# Patient Record
Sex: FEMALE | Race: WHITE | Employment: PART TIME | URBAN - METROPOLITAN AREA
[De-identification: names, ages, dates, MRNs, and addresses within clinical notes are randomized per-mention and may not be internally consistent; named-entity substitution may affect disease eponyms.]

---

## 2017-08-18 ENCOUNTER — HOSPITAL ENCOUNTER (EMERGENCY)
Age: 22
Discharge: HOME | End: 2017-08-18
Attending: EMERGENCY MEDICINE
Payer: COMMERCIAL

## 2017-08-18 VITALS
BODY MASS INDEX: 41.46 KG/M2 | SYSTOLIC BLOOD PRESSURE: 122 MMHG | TEMPERATURE: 99 F | HEIGHT: 66 IN | RESPIRATION RATE: 16 BRPM | DIASTOLIC BLOOD PRESSURE: 76 MMHG | HEART RATE: 97 BPM | WEIGHT: 258 LBS

## 2017-08-18 DIAGNOSIS — L02.91 ABSCESS: Primary | ICD-10-CM

## 2017-08-18 PROCEDURE — 87147 CULTURE TYPE IMMUNOLOGIC: CPT | Performed by: NURSE PRACTITIONER

## 2017-08-18 PROCEDURE — 10061 I&D ABSCESS COMP/MULTIPLE: CPT

## 2017-08-18 PROCEDURE — 87186 SC STD MICRODIL/AGAR DIL: CPT | Performed by: NURSE PRACTITIONER

## 2017-08-18 PROCEDURE — 87070 CULTURE OTHR SPECIMN AEROBIC: CPT | Performed by: NURSE PRACTITIONER

## 2017-08-18 PROCEDURE — 87205 SMEAR GRAM STAIN: CPT | Performed by: NURSE PRACTITIONER

## 2017-08-18 PROCEDURE — 99283 EMERGENCY DEPT VISIT LOW MDM: CPT

## 2017-08-18 RX ORDER — SULFAMETHOXAZOLE AND TRIMETHOPRIM 800; 160 MG/1; MG/1
1 TABLET ORAL 2 TIMES DAILY
Qty: 20 TABLET | Refills: 0 | Status: SHIPPED | OUTPATIENT
Start: 2017-08-18 | End: 2017-08-28

## 2017-08-19 NOTE — ED PROVIDER NOTES
Patient Seen in: THE [facility] Emergency Department In [place]    History   Patient presents with:  Abscess (integumentary)    Stated Complaint: ABSCESS    26-year-old female who presents to the emergency room with complaints of a abscess and erythema to the • High Blood Pressure Paternal Grandmother        Smoking status: Never Smoker                                                              Smokeless tobacco: Never Used                      Alcohol use:  No                Review of Systems   Constitutional Psychiatric: She has a normal mood and affect. Her behavior is normal. Judgment and thought content normal.   Nursing note and vitals reviewed.         ED Course     Labs Reviewed   AEROBIC BACTERIAL CULTURE   Procedure note:   Verbal consent obtained from

## 2017-11-06 PROCEDURE — 87660 TRICHOMONAS VAGIN DIR PROBE: CPT | Performed by: OBSTETRICS & GYNECOLOGY

## 2017-11-06 PROCEDURE — 87480 CANDIDA DNA DIR PROBE: CPT | Performed by: OBSTETRICS & GYNECOLOGY

## 2017-11-06 PROCEDURE — 88175 CYTOPATH C/V AUTO FLUID REDO: CPT | Performed by: OBSTETRICS & GYNECOLOGY

## 2017-11-06 PROCEDURE — 87491 CHLMYD TRACH DNA AMP PROBE: CPT | Performed by: OBSTETRICS & GYNECOLOGY

## 2017-11-06 PROCEDURE — 87591 N.GONORRHOEAE DNA AMP PROB: CPT | Performed by: OBSTETRICS & GYNECOLOGY

## 2017-11-06 PROCEDURE — 87510 GARDNER VAG DNA DIR PROBE: CPT | Performed by: OBSTETRICS & GYNECOLOGY

## 2018-05-22 ENCOUNTER — LAB ENCOUNTER (OUTPATIENT)
Dept: LAB | Age: 23
End: 2018-05-22
Attending: PHYSICIAN ASSISTANT
Payer: COMMERCIAL

## 2018-05-22 DIAGNOSIS — L30.4 INTERTRIGO: ICD-10-CM

## 2018-05-22 PROCEDURE — 87206 SMEAR FLUORESCENT/ACID STAI: CPT

## 2018-05-22 PROCEDURE — 87077 CULTURE AEROBIC IDENTIFY: CPT

## 2018-05-22 PROCEDURE — 87102 FUNGUS ISOLATION CULTURE: CPT

## 2018-05-22 PROCEDURE — 87205 SMEAR GRAM STAIN: CPT

## 2018-05-22 PROCEDURE — 87070 CULTURE OTHR SPECIMN AEROBIC: CPT

## 2018-05-22 PROCEDURE — 87186 SC STD MICRODIL/AGAR DIL: CPT

## 2018-05-25 NOTE — PROGRESS NOTES
Begin Silvadene cream to affected areas daily. May mix with ketoconazole for ease of use if desired. See TE for details.

## 2018-11-25 ENCOUNTER — APPOINTMENT (OUTPATIENT)
Dept: GENERAL RADIOLOGY | Age: 23
End: 2018-11-25
Attending: EMERGENCY MEDICINE
Payer: COMMERCIAL

## 2018-11-25 ENCOUNTER — HOSPITAL ENCOUNTER (EMERGENCY)
Age: 23
Discharge: HOME | End: 2018-11-25
Attending: EMERGENCY MEDICINE
Payer: COMMERCIAL

## 2018-11-25 VITALS
TEMPERATURE: 98 F | SYSTOLIC BLOOD PRESSURE: 115 MMHG | RESPIRATION RATE: 16 BRPM | DIASTOLIC BLOOD PRESSURE: 56 MMHG | WEIGHT: 265 LBS | HEART RATE: 98 BPM | HEIGHT: 65 IN | OXYGEN SATURATION: 97 % | BODY MASS INDEX: 44.15 KG/M2

## 2018-11-25 DIAGNOSIS — J06.9 VIRAL UPPER RESPIRATORY INFECTION: Primary | ICD-10-CM

## 2018-11-25 DIAGNOSIS — J45.901 ASTHMA EXACERBATION, MILD: ICD-10-CM

## 2018-11-25 PROCEDURE — 94640 AIRWAY INHALATION TREATMENT: CPT

## 2018-11-25 PROCEDURE — 71046 X-RAY EXAM CHEST 2 VIEWS: CPT | Performed by: EMERGENCY MEDICINE

## 2018-11-25 PROCEDURE — 99284 EMERGENCY DEPT VISIT MOD MDM: CPT

## 2018-11-25 RX ORDER — PREDNISONE 20 MG/1
60 TABLET ORAL ONCE
Status: COMPLETED | OUTPATIENT
Start: 2018-11-25 | End: 2018-11-25

## 2018-11-25 RX ORDER — CODEINE PHOSPHATE AND GUAIFENESIN 10; 100 MG/5ML; MG/5ML
5 SOLUTION ORAL EVERY 6 HOURS PRN
Qty: 180 ML | Refills: 0 | Status: SHIPPED | OUTPATIENT
Start: 2018-11-25 | End: 2020-02-20 | Stop reason: ALTCHOICE

## 2018-11-25 RX ORDER — PREDNISONE 20 MG/1
40 TABLET ORAL DAILY
Qty: 10 TABLET | Refills: 0 | Status: SHIPPED | OUTPATIENT
Start: 2018-11-25 | End: 2018-11-30

## 2018-11-25 RX ORDER — IPRATROPIUM BROMIDE AND ALBUTEROL SULFATE 2.5; .5 MG/3ML; MG/3ML
3 SOLUTION RESPIRATORY (INHALATION) ONCE
Status: COMPLETED | OUTPATIENT
Start: 2018-11-25 | End: 2018-11-25

## 2018-11-28 NOTE — ED PROVIDER NOTES
Patient Seen in: THE [facility] Emergency Department In [place]    History   Patient presents with:  Cough/URI    Stated Complaint: cough and SOB, denies fever    HPI    49-year-old female presents emergency department had a bad cough since Monday along with nondistended, no rebound no guarding  no hepatosplenomegaly bowel sounds are present , no pulsatile mass  Extremities: No clubbing cyanosis or edema 2+ distal pulses.   Neuro: Cranial nerves II through XII intact with no gross focal sensory or motor abnorma

## 2018-12-09 ENCOUNTER — HOSPITAL ENCOUNTER (EMERGENCY)
Age: 23
Discharge: HOME | End: 2018-12-09
Payer: COMMERCIAL

## 2018-12-09 VITALS
RESPIRATION RATE: 18 BRPM | OXYGEN SATURATION: 97 % | SYSTOLIC BLOOD PRESSURE: 101 MMHG | HEIGHT: 65 IN | BODY MASS INDEX: 43.32 KG/M2 | TEMPERATURE: 97 F | DIASTOLIC BLOOD PRESSURE: 68 MMHG | WEIGHT: 260 LBS | HEART RATE: 93 BPM

## 2018-12-09 DIAGNOSIS — H66.91 RIGHT OTITIS MEDIA, UNSPECIFIED OTITIS MEDIA TYPE: Primary | ICD-10-CM

## 2018-12-09 PROCEDURE — 99283 EMERGENCY DEPT VISIT LOW MDM: CPT

## 2018-12-09 PROCEDURE — 87430 STREP A AG IA: CPT | Performed by: PHYSICIAN ASSISTANT

## 2018-12-09 PROCEDURE — 87081 CULTURE SCREEN ONLY: CPT | Performed by: PHYSICIAN ASSISTANT

## 2018-12-09 PROCEDURE — 87147 CULTURE TYPE IMMUNOLOGIC: CPT | Performed by: PHYSICIAN ASSISTANT

## 2018-12-09 RX ORDER — AZITHROMYCIN 250 MG/1
TABLET, FILM COATED ORAL
Qty: 1 PACKAGE | Refills: 0 | Status: SHIPPED | OUTPATIENT
Start: 2018-12-09 | End: 2018-12-14

## 2018-12-09 NOTE — ED PROVIDER NOTES
Patient Seen in: THE [facility] Emergency Department In [place]    History   Patient presents with:  Ear Problem Pain (neurosensory)    Stated Complaint: r. ear pain    44-year-old  female with a history of asthma, allergic rhinitis presents to the E is noted. Left Ear: Hearing, tympanic membrane, external ear and ear canal normal.   Nose: Nose normal. Right sinus exhibits no maxillary sinus tenderness and no frontal sinus tenderness.  Left sinus exhibits no maxillary sinus tenderness and no frontal s

## 2018-12-14 ENCOUNTER — HOSPITAL ENCOUNTER (EMERGENCY)
Age: 23
Discharge: HOME | End: 2018-12-14
Attending: EMERGENCY MEDICINE
Payer: MEDICAID

## 2018-12-14 ENCOUNTER — APPOINTMENT (OUTPATIENT)
Dept: GENERAL RADIOLOGY | Age: 23
End: 2018-12-14
Attending: NURSE PRACTITIONER
Payer: MEDICAID

## 2018-12-14 VITALS
BODY MASS INDEX: 43.32 KG/M2 | TEMPERATURE: 100 F | HEART RATE: 99 BPM | HEIGHT: 65 IN | WEIGHT: 260 LBS | RESPIRATION RATE: 18 BRPM | DIASTOLIC BLOOD PRESSURE: 63 MMHG | SYSTOLIC BLOOD PRESSURE: 129 MMHG | OXYGEN SATURATION: 98 %

## 2018-12-14 DIAGNOSIS — J45.901 ASTHMA EXACERBATION, MILD: Primary | ICD-10-CM

## 2018-12-14 PROCEDURE — 71046 X-RAY EXAM CHEST 2 VIEWS: CPT | Performed by: NURSE PRACTITIONER

## 2018-12-14 PROCEDURE — 99284 EMERGENCY DEPT VISIT MOD MDM: CPT

## 2018-12-14 PROCEDURE — 94640 AIRWAY INHALATION TREATMENT: CPT

## 2018-12-14 RX ORDER — IPRATROPIUM BROMIDE AND ALBUTEROL SULFATE 2.5; .5 MG/3ML; MG/3ML
3 SOLUTION RESPIRATORY (INHALATION) ONCE
Status: COMPLETED | OUTPATIENT
Start: 2018-12-14 | End: 2018-12-14

## 2018-12-14 RX ORDER — BENZONATATE 100 MG/1
100 CAPSULE ORAL 3 TIMES DAILY PRN
Qty: 30 CAPSULE | Refills: 0 | Status: SHIPPED | OUTPATIENT
Start: 2018-12-14 | End: 2019-01-13

## 2018-12-14 RX ORDER — PREDNISONE 20 MG/1
20 TABLET ORAL 2 TIMES DAILY
Qty: 10 TABLET | Refills: 0 | Status: SHIPPED | OUTPATIENT
Start: 2018-12-14 | End: 2018-12-19

## 2018-12-14 RX ORDER — PREDNISONE 20 MG/1
60 TABLET ORAL ONCE
Status: COMPLETED | OUTPATIENT
Start: 2018-12-14 | End: 2018-12-14

## 2018-12-14 RX ORDER — IBUPROFEN 800 MG/1
800 TABLET ORAL ONCE
Status: COMPLETED | OUTPATIENT
Start: 2018-12-14 | End: 2018-12-14

## 2018-12-14 RX ORDER — IPRATROPIUM BROMIDE AND ALBUTEROL SULFATE 2.5; .5 MG/3ML; MG/3ML
3 SOLUTION RESPIRATORY (INHALATION) ONCE
Status: DISCONTINUED | OUTPATIENT
Start: 2018-12-14 | End: 2018-12-14

## 2018-12-15 NOTE — ED PROVIDER NOTES
Patient Seen in: [facility] Emergency Department In [place]    History   Patient presents with:  Cough/URI    Stated Complaint: cough-hx asthma    71-year-old female who presents to the emergency room with complaints of a worsening cough for last 2-3 week complaint: cough-hx asthma  Other systems are as noted in HPI. Constitutional and vital signs reviewed. All other systems reviewed and negative except as noted above.     Physical Exam     ED Triage Vitals [12/14/18 1852]   /59   Pulse 101   Res XR CHEST PA + LAT CHEST (CPT=71046)  INDICATIONS:  cough-hx asthma  COMPARISON:  [facility], XR CHEST PA + LAT CHEST (OPT=79685), 11/25/2018, 18:45. TECHNIQUE:  PA and lateral chest radiographs were obtained.   PATIENT STATED HISTORY: (As transcribed by Te expressed clear understanding of these instructions and agrees to the following plan provided.   The patient and/or family was also given written discharge instructions including information regarding today's visit and indications prompting immediate return

## 2018-12-15 NOTE — ED INITIAL ASSESSMENT (HPI)
C/o cough for 2-3 weeks, increased 12/13. States coughs so hard, causes her to leak urine and \"see spots\". Hx of asthma, has used inhaler \"several\" times today, unsure of number.  Fever here, not checked at home  States just finished Z-pack for ear infe

## 2019-11-07 ENCOUNTER — APPOINTMENT (OUTPATIENT)
Dept: CT IMAGING | Age: 24
End: 2019-11-07
Attending: EMERGENCY MEDICINE
Payer: MEDICAID

## 2019-11-07 ENCOUNTER — HOSPITAL ENCOUNTER (EMERGENCY)
Age: 24
Discharge: HOME | End: 2019-11-07
Attending: EMERGENCY MEDICINE
Payer: MEDICAID

## 2019-11-07 VITALS
SYSTOLIC BLOOD PRESSURE: 128 MMHG | DIASTOLIC BLOOD PRESSURE: 83 MMHG | OXYGEN SATURATION: 100 % | RESPIRATION RATE: 14 BRPM | TEMPERATURE: 98 F | HEIGHT: 65 IN | WEIGHT: 286 LBS | BODY MASS INDEX: 47.65 KG/M2 | HEART RATE: 79 BPM

## 2019-11-07 DIAGNOSIS — I88.0 MESENTERIC ADENITIS: Primary | ICD-10-CM

## 2019-11-07 PROCEDURE — 81025 URINE PREGNANCY TEST: CPT

## 2019-11-07 PROCEDURE — 87086 URINE CULTURE/COLONY COUNT: CPT | Performed by: EMERGENCY MEDICINE

## 2019-11-07 PROCEDURE — 80053 COMPREHEN METABOLIC PANEL: CPT | Performed by: EMERGENCY MEDICINE

## 2019-11-07 PROCEDURE — 96374 THER/PROPH/DIAG INJ IV PUSH: CPT

## 2019-11-07 PROCEDURE — 85025 COMPLETE CBC W/AUTO DIFF WBC: CPT | Performed by: EMERGENCY MEDICINE

## 2019-11-07 PROCEDURE — 96361 HYDRATE IV INFUSION ADD-ON: CPT

## 2019-11-07 PROCEDURE — 99284 EMERGENCY DEPT VISIT MOD MDM: CPT

## 2019-11-07 PROCEDURE — 74177 CT ABD & PELVIS W/CONTRAST: CPT | Performed by: EMERGENCY MEDICINE

## 2019-11-07 PROCEDURE — 83690 ASSAY OF LIPASE: CPT | Performed by: EMERGENCY MEDICINE

## 2019-11-07 PROCEDURE — 81001 URINALYSIS AUTO W/SCOPE: CPT | Performed by: EMERGENCY MEDICINE

## 2019-11-07 RX ORDER — KETOROLAC TROMETHAMINE 30 MG/ML
30 INJECTION, SOLUTION INTRAMUSCULAR; INTRAVENOUS ONCE
Status: COMPLETED | OUTPATIENT
Start: 2019-11-07 | End: 2019-11-07

## 2019-11-07 RX ORDER — KETOROLAC TROMETHAMINE 10 MG/1
10 TABLET, FILM COATED ORAL EVERY 6 HOURS PRN
Qty: 30 TABLET | Refills: 0 | Status: SHIPPED | OUTPATIENT
Start: 2019-11-07 | End: 2019-11-14

## 2019-11-07 NOTE — ED PROVIDER NOTES
Patient Seen in: THE [facility] Emergency Department In [place]      History   Patient presents with:  Abdomen/Flank Pain (GI/)    Stated Complaint: mid abd pain    HPI  22-year-old female presents for evaluation of abdominal pain.   Patient describes Tianna Frias rhythm. Abdomen: Soft, epigastric, right upper quadrant and right lateral abdominal tenderness, no Escalona sign, tenderness just above McBurney's point, no CVA tenderness  Skin: No rash. No edema. Neurologic: No focal neurologic deficits.   Normal speech Radiology Data Registry) which includes the Dose Index Registry. PATIENT STATED HISTORY:(As transcribed by Technologist)  Patient complains of mid upper abdominal pain and nausea today.    CONTRAST USED:  100cc of Omnipaque 350  FINDINGS:  APPENDIX:  Not w diagnosis)    Disposition:  There is no disposition on file for this visit. There is no disposition time on file for this visit.     Follow-up:  Jean-Pierre Ye MD  [address] [place] [address]  [phone]    Schedule an appointment as soon as p

## 2019-11-08 NOTE — ED NOTES
Received report from Kati Figueroa, [facility]. Pt is in [place].

## 2019-11-09 ENCOUNTER — HOSPITAL ENCOUNTER (EMERGENCY)
Age: 24
Discharge: HOME | End: 2019-11-09
Attending: EMERGENCY MEDICINE
Payer: MEDICAID

## 2019-11-09 VITALS
HEIGHT: 65 IN | TEMPERATURE: 98 F | DIASTOLIC BLOOD PRESSURE: 60 MMHG | RESPIRATION RATE: 18 BRPM | SYSTOLIC BLOOD PRESSURE: 115 MMHG | WEIGHT: 280 LBS | OXYGEN SATURATION: 94 % | BODY MASS INDEX: 46.65 KG/M2 | HEART RATE: 73 BPM

## 2019-11-09 DIAGNOSIS — R10.9 ABDOMINAL PAIN, ACUTE: Primary | ICD-10-CM

## 2019-11-09 PROCEDURE — 81025 URINE PREGNANCY TEST: CPT

## 2019-11-09 PROCEDURE — 81001 URINALYSIS AUTO W/SCOPE: CPT | Performed by: EMERGENCY MEDICINE

## 2019-11-09 PROCEDURE — 80053 COMPREHEN METABOLIC PANEL: CPT | Performed by: EMERGENCY MEDICINE

## 2019-11-09 PROCEDURE — 99284 EMERGENCY DEPT VISIT MOD MDM: CPT

## 2019-11-09 PROCEDURE — 96361 HYDRATE IV INFUSION ADD-ON: CPT

## 2019-11-09 PROCEDURE — 96375 TX/PRO/DX INJ NEW DRUG ADDON: CPT

## 2019-11-09 PROCEDURE — 85025 COMPLETE CBC W/AUTO DIFF WBC: CPT | Performed by: EMERGENCY MEDICINE

## 2019-11-09 PROCEDURE — 96374 THER/PROPH/DIAG INJ IV PUSH: CPT

## 2019-11-09 PROCEDURE — 83690 ASSAY OF LIPASE: CPT | Performed by: EMERGENCY MEDICINE

## 2019-11-09 RX ORDER — TRAMADOL HYDROCHLORIDE 50 MG/1
TABLET ORAL EVERY 6 HOURS PRN
Qty: 10 TABLET | Refills: 0 | Status: SHIPPED | OUTPATIENT
Start: 2019-11-09 | End: 2019-11-16

## 2019-11-09 RX ORDER — HYDROMORPHONE HYDROCHLORIDE 1 MG/ML
1 INJECTION, SOLUTION INTRAMUSCULAR; INTRAVENOUS; SUBCUTANEOUS ONCE
Status: COMPLETED | OUTPATIENT
Start: 2019-11-09 | End: 2019-11-09

## 2019-11-09 RX ORDER — IBUPROFEN 600 MG/1
600 TABLET ORAL EVERY 8 HOURS PRN
Qty: 30 TABLET | Refills: 0 | Status: SHIPPED | OUTPATIENT
Start: 2019-11-09 | End: 2019-11-16

## 2019-11-09 RX ORDER — ONDANSETRON 2 MG/ML
4 INJECTION INTRAMUSCULAR; INTRAVENOUS ONCE
Status: COMPLETED | OUTPATIENT
Start: 2019-11-09 | End: 2019-11-09

## 2019-11-10 NOTE — ED PROVIDER NOTES
Patient Seen in: [facility] Emergency Department In [place]      History   Patient presents with:  Abdominal Pain    Stated Complaint: Pt presents to the ER with complaint of generalized abdominal pain with no reli*    HPI    60-year-old female presents t Physical Exam    General appearance: This is a young adult female sitting on a gurney. Vital signs were reviewed per nurse's notes. HEENT: Normocephalic atraumatic. Anicteric sclera.   Oral mucosa is moist.  Oropharynx is normal.  Neck: No adenop nontender. The patient was symptomatically improved. We discussed repeat test results. In the absence of any leukocytosis or ongoing abdominal tenderness, I recommended deferring reimaging at this time.   Symptoms are likely most consistent with a mesent

## 2019-11-10 NOTE — ED INITIAL ASSESSMENT (HPI)
Pt presents to the ER with complaint of generalized abdominal pain with no relief from tyelenol. Endorses nausea, denies vomiting.

## 2020-02-20 ENCOUNTER — OFFICE VISIT (OUTPATIENT)
Dept: FAMILY MEDICINE CLINIC | Facility: CLINIC | Age: 25
End: 2020-02-20

## 2020-02-20 VITALS
BODY MASS INDEX: 47.98 KG/M2 | WEIGHT: 288 LBS | RESPIRATION RATE: 16 BRPM | TEMPERATURE: 98 F | SYSTOLIC BLOOD PRESSURE: 120 MMHG | HEART RATE: 104 BPM | OXYGEN SATURATION: 100 % | DIASTOLIC BLOOD PRESSURE: 80 MMHG | HEIGHT: 65 IN

## 2020-02-20 DIAGNOSIS — Z00.00 LABORATORY EXAMINATION ORDERED AS PART OF A ROUTINE GENERAL MEDICAL EXAMINATION: ICD-10-CM

## 2020-02-20 DIAGNOSIS — Z01.419 WELL FEMALE EXAM WITH ROUTINE GYNECOLOGICAL EXAM: Primary | ICD-10-CM

## 2020-02-20 DIAGNOSIS — J45.40 MODERATE PERSISTENT ASTHMA WITHOUT COMPLICATION: ICD-10-CM

## 2020-02-20 DIAGNOSIS — N89.8 VAGINAL DISCHARGE: ICD-10-CM

## 2020-02-20 DIAGNOSIS — E66.01 CLASS 3 SEVERE OBESITY DUE TO EXCESS CALORIES WITHOUT SERIOUS COMORBIDITY WITH BODY MASS INDEX (BMI) OF 45.0 TO 49.9 IN ADULT (HCC): ICD-10-CM

## 2020-02-20 DIAGNOSIS — Z30.431 IUD CHECK UP: ICD-10-CM

## 2020-02-20 DIAGNOSIS — Z23 NEED FOR VACCINATION: ICD-10-CM

## 2020-02-20 DIAGNOSIS — L98.9: ICD-10-CM

## 2020-02-20 DIAGNOSIS — Z12.4 ROUTINE CERVICAL SMEAR: ICD-10-CM

## 2020-02-20 PROCEDURE — 87491 CHLMYD TRACH DNA AMP PROBE: CPT | Performed by: FAMILY MEDICINE

## 2020-02-20 PROCEDURE — 90472 IMMUNIZATION ADMIN EACH ADD: CPT | Performed by: FAMILY MEDICINE

## 2020-02-20 PROCEDURE — 90715 TDAP VACCINE 7 YRS/> IM: CPT | Performed by: FAMILY MEDICINE

## 2020-02-20 PROCEDURE — 99385 PREV VISIT NEW AGE 18-39: CPT | Performed by: FAMILY MEDICINE

## 2020-02-20 PROCEDURE — 88175 CYTOPATH C/V AUTO FLUID REDO: CPT | Performed by: FAMILY MEDICINE

## 2020-02-20 PROCEDURE — 90471 IMMUNIZATION ADMIN: CPT | Performed by: FAMILY MEDICINE

## 2020-02-20 PROCEDURE — 87591 N.GONORRHOEAE DNA AMP PROB: CPT | Performed by: FAMILY MEDICINE

## 2020-02-20 PROCEDURE — 90686 IIV4 VACC NO PRSV 0.5 ML IM: CPT | Performed by: FAMILY MEDICINE

## 2020-02-20 RX ORDER — NYSTATIN 100000 U/G
OINTMENT TOPICAL
Qty: 30 G | Refills: 2 | Status: SHIPPED | OUTPATIENT
Start: 2020-02-20 | End: 2020-04-08

## 2020-02-20 RX ORDER — FLUTICASONE PROPIONATE AND SALMETEROL 250; 50 UG/1; UG/1
1 POWDER RESPIRATORY (INHALATION) EVERY 12 HOURS SCHEDULED
Qty: 3 EACH | Refills: 3 | Status: SHIPPED | OUTPATIENT
Start: 2020-02-20 | End: 2021-09-01

## 2020-02-20 RX ORDER — METRONIDAZOLE 500 MG/1
500 TABLET ORAL 2 TIMES DAILY
Qty: 14 TABLET | Refills: 0 | Status: SHIPPED | OUTPATIENT
Start: 2020-02-20 | End: 2020-02-27

## 2020-02-20 RX ORDER — ALBUTEROL SULFATE 90 UG/1
AEROSOL, METERED RESPIRATORY (INHALATION)
Qty: 8.5 G | Refills: 5 | Status: SHIPPED | OUTPATIENT
Start: 2020-02-20 | End: 2020-07-07

## 2020-02-20 NOTE — PROGRESS NOTES
Moiz Verduzco is a 25year old female.     CC:  Patient presents with:  Establish Care  Well Adult: annual visit and pap smear       HPI:  HPV Vaccines(1 - Female 2-dose series) due on 05/02/2006  Annual Depression Screen due on 11/03/2016  Annual anything, or prepared to do anything to end your life? (lifetime): No  Score and Suggested Actions - Office Visit: No Risk  Score and Intervention  Score and Suggested Actions - Office Visit: No Risk        Allergies:    Ceclor                  UNKNOWN  Pe 08/08/2001      MMR                   09/28/1996 04/17/2000      Meningococcal-Menactra                          08/13/2010      TDAP                  08/13/2010      Tb Intradermal Test   07/20/2017 07/28/2017      Varicella             08/18/2005 tenderness  BREAST: No masses or lesions, no nipple discharge, normal appearing skin, no tenderness  LUNGS: clear to auscultation  CARDIO: RRR without murmur  GI: good BS's,no masses, HSM or tenderness  : +erythematus flat macular rash of perineum and la • Albuterol Sulfate HFA (PROAIR HFA) 108 (90 Base) MCG/ACT Inhalation Aero Soln 8.5 g 5     Sig: INHALE 1 TO 2 PUFFS BY MOUTH EVERY 4 TO 6 HOURS AS NEEDED   • fluticasone-salmeterol (ADVAIR DISKUS) 250-50 MCG/DOSE Inhalation Aerosol Powder, Breath Activa

## 2020-02-21 LAB
C TRACH DNA SPEC QL NAA+PROBE: NEGATIVE
N GONORRHOEA DNA SPEC QL NAA+PROBE: NEGATIVE

## 2020-02-22 ENCOUNTER — LAB ENCOUNTER (OUTPATIENT)
Dept: LAB | Age: 25
End: 2020-02-22
Attending: FAMILY MEDICINE
Payer: COMMERCIAL

## 2020-02-22 DIAGNOSIS — Z00.00 LABORATORY EXAMINATION ORDERED AS PART OF A ROUTINE GENERAL MEDICAL EXAMINATION: ICD-10-CM

## 2020-02-22 DIAGNOSIS — R73.01 ELEVATED FASTING BLOOD SUGAR: ICD-10-CM

## 2020-02-22 LAB
ALBUMIN SERPL-MCNC: 3.8 G/DL (ref 3.4–5)
ALBUMIN/GLOB SERPL: 1.2 {RATIO} (ref 1–2)
ALP LIVER SERPL-CCNC: 43 U/L (ref 37–98)
ALT SERPL-CCNC: 30 U/L (ref 13–56)
ANION GAP SERPL CALC-SCNC: 4 MMOL/L (ref 0–18)
AST SERPL-CCNC: 17 U/L (ref 15–37)
BASOPHILS # BLD AUTO: 0.04 X10(3) UL (ref 0–0.2)
BASOPHILS NFR BLD AUTO: 0.6 %
BILIRUB SERPL-MCNC: 0.5 MG/DL (ref 0.1–2)
BUN BLD-MCNC: 12 MG/DL (ref 7–18)
BUN/CREAT SERPL: 14 (ref 10–20)
CALCIUM BLD-MCNC: 9.2 MG/DL (ref 8.5–10.1)
CHLORIDE SERPL-SCNC: 106 MMOL/L (ref 98–112)
CHOLEST SMN-MCNC: 137 MG/DL (ref ?–200)
CO2 SERPL-SCNC: 29 MMOL/L (ref 21–32)
CREAT BLD-MCNC: 0.86 MG/DL (ref 0.55–1.02)
DEPRECATED RDW RBC AUTO: 39.4 FL (ref 35.1–46.3)
EOSINOPHIL # BLD AUTO: 0.25 X10(3) UL (ref 0–0.7)
EOSINOPHIL NFR BLD AUTO: 3.9 %
ERYTHROCYTE [DISTWIDTH] IN BLOOD BY AUTOMATED COUNT: 13.2 % (ref 11–15)
GLOBULIN PLAS-MCNC: 3.2 G/DL (ref 2.8–4.4)
GLUCOSE BLD-MCNC: 106 MG/DL (ref 70–99)
HCT VFR BLD AUTO: 42 % (ref 35–48)
HDLC SERPL-MCNC: 43 MG/DL (ref 40–59)
HGB BLD-MCNC: 13.7 G/DL (ref 12–16)
IMM GRANULOCYTES # BLD AUTO: 0.01 X10(3) UL (ref 0–1)
IMM GRANULOCYTES NFR BLD: 0.2 %
LDLC SERPL CALC-MCNC: 80 MG/DL (ref ?–100)
LYMPHOCYTES # BLD AUTO: 1.86 X10(3) UL (ref 1–4)
LYMPHOCYTES NFR BLD AUTO: 29.2 %
M PROTEIN MFR SERPL ELPH: 7 G/DL (ref 6.4–8.2)
MCH RBC QN AUTO: 27.3 PG (ref 26–34)
MCHC RBC AUTO-ENTMCNC: 32.6 G/DL (ref 31–37)
MCV RBC AUTO: 83.8 FL (ref 80–100)
MONOCYTES # BLD AUTO: 0.51 X10(3) UL (ref 0.1–1)
MONOCYTES NFR BLD AUTO: 8 %
NEUTROPHILS # BLD AUTO: 3.7 X10 (3) UL (ref 1.5–7.7)
NEUTROPHILS # BLD AUTO: 3.7 X10(3) UL (ref 1.5–7.7)
NEUTROPHILS NFR BLD AUTO: 58.1 %
NONHDLC SERPL-MCNC: 94 MG/DL (ref ?–130)
OSMOLALITY SERPL CALC.SUM OF ELEC: 288 MOSM/KG (ref 275–295)
PATIENT FASTING Y/N/NP: YES
PATIENT FASTING Y/N/NP: YES
PLATELET # BLD AUTO: 236 10(3)UL (ref 150–450)
POTASSIUM SERPL-SCNC: 3.8 MMOL/L (ref 3.5–5.1)
RBC # BLD AUTO: 5.01 X10(6)UL (ref 3.8–5.3)
SODIUM SERPL-SCNC: 139 MMOL/L (ref 136–145)
TRIGL SERPL-MCNC: 68 MG/DL (ref 30–149)
TSI SER-ACNC: 1.17 MIU/ML (ref 0.36–3.74)
VLDLC SERPL CALC-MCNC: 14 MG/DL (ref 0–30)
WBC # BLD AUTO: 6.4 X10(3) UL (ref 4–11)

## 2020-02-22 PROCEDURE — 80061 LIPID PANEL: CPT

## 2020-02-22 PROCEDURE — 83036 HEMOGLOBIN GLYCOSYLATED A1C: CPT

## 2020-02-22 PROCEDURE — 36415 COLL VENOUS BLD VENIPUNCTURE: CPT

## 2020-02-22 PROCEDURE — 85025 COMPLETE CBC W/AUTO DIFF WBC: CPT

## 2020-02-22 PROCEDURE — 84443 ASSAY THYROID STIM HORMONE: CPT

## 2020-02-22 PROCEDURE — 80053 COMPREHEN METABOLIC PANEL: CPT

## 2020-02-24 LAB
EST. AVERAGE GLUCOSE BLD GHB EST-MCNC: 126 MG/DL (ref 68–126)
HBA1C MFR BLD HPLC: 6 % (ref ?–5.7)

## 2020-02-27 ENCOUNTER — TELEPHONE (OUTPATIENT)
Dept: FAMILY MEDICINE CLINIC | Facility: CLINIC | Age: 25
End: 2020-02-27

## 2020-02-27 DIAGNOSIS — R73.03 PREDIABETES: Primary | ICD-10-CM

## 2020-02-27 NOTE — TELEPHONE ENCOUNTER
----- Message from Daniel Palmer MD sent at 2/26/2020  8:44 AM CST -----  Results reviewed. Please inform patient she has prediabetes - would recommend starting metformin 500mg ER daily.  She was referred to weight loss clinic - and this would be the first m

## 2020-02-28 RX ORDER — METFORMIN HYDROCHLORIDE 500 MG/1
500 TABLET, EXTENDED RELEASE ORAL DAILY
Qty: 90 TABLET | Refills: 2 | Status: SHIPPED | OUTPATIENT
Start: 2020-02-28 | End: 2020-06-12

## 2020-02-28 NOTE — TELEPHONE ENCOUNTER
Called pt and lmom with results/instructions. Med sent to Rx and lab in system. Asked pt after reviewing message to call office with any questions.     msg sent to [facility] eligibility to change PCP

## 2020-03-02 NOTE — TELEPHONE ENCOUNTER
I called pts cell ([phone]) and spoke to her regarding results. Pt states the number we left a VM on was her mom's cell ([phone]). Mom is on her HIPAA. I informed pt that on the snapshot, we have pts cell and then mom's cell listed as her.   Pt

## 2020-03-13 ENCOUNTER — TELEPHONE (OUTPATIENT)
Dept: FAMILY MEDICINE CLINIC | Facility: CLINIC | Age: 25
End: 2020-03-13

## 2020-03-13 ENCOUNTER — OFFICE VISIT (OUTPATIENT)
Dept: FAMILY MEDICINE CLINIC | Facility: CLINIC | Age: 25
End: 2020-03-13

## 2020-03-13 VITALS
DIASTOLIC BLOOD PRESSURE: 82 MMHG | SYSTOLIC BLOOD PRESSURE: 118 MMHG | OXYGEN SATURATION: 98 % | BODY MASS INDEX: 46.98 KG/M2 | WEIGHT: 282 LBS | HEIGHT: 65 IN | TEMPERATURE: 99 F | HEART RATE: 62 BPM

## 2020-03-13 DIAGNOSIS — J02.0 STREP PHARYNGITIS: Primary | ICD-10-CM

## 2020-03-13 DIAGNOSIS — J02.9 SORE THROAT: ICD-10-CM

## 2020-03-13 DIAGNOSIS — J11.1 INFLUENZA-LIKE ILLNESS: ICD-10-CM

## 2020-03-13 LAB
CONTROL LINE PRESENT WITH A CLEAR BACKGROUND (YES/NO): YES YES/NO
KIT LOT #: ABNORMAL NUMERIC
STREP GRP A CUL-SCR: POSITIVE

## 2020-03-13 PROCEDURE — 99213 OFFICE O/P EST LOW 20 MIN: CPT | Performed by: NURSE PRACTITIONER

## 2020-03-13 PROCEDURE — 87880 STREP A ASSAY W/OPTIC: CPT | Performed by: NURSE PRACTITIONER

## 2020-03-13 RX ORDER — AZITHROMYCIN 250 MG/1
TABLET, FILM COATED ORAL
Qty: 6 TABLET | Refills: 0 | Status: SHIPPED | OUTPATIENT
Start: 2020-03-13 | End: 2020-06-12

## 2020-03-13 NOTE — PROGRESS NOTES
Patient presents with:  Sore Throat: dripping nose,fatigue, chills, sore throat x 3 days  :    HPI:   Jasmin Westbrook is a 25year old female who presents for upper respiratory symptoms for 3 days. Started suddenly.   Symptoms have been worsening sinc • Diabetes Maternal Grandmother    • High Blood Pressure Maternal Grandmother    • High Cholesterol Maternal Grandmother    • Diabetes Paternal Grandmother    • High Blood Pressure Paternal Grandmother       Social History    Tobacco Use      Smoking statu Kit Expiration Date 10,232,021 Date         ASSESSMENT AND PLAN:   Wilver House is a 25year old female who presents with flu-like symptoms    ASSESSMENT:  Sore throat  Strep pharyngitis  (primary encounter diagnosis)  Influenza-like illness · No work or school for the first 2 days of taking the antibiotics. After this time, you will not be contagious. You can then return to school or work if you are feeling better.   · Take antibiotic medicine for the full 10 days, even if you feel better.  [place] · Don’t smoke, and stay away from secondhand smoke. Date Last Reviewed: 11/1/2017  © 6496-2576 [employer]. [address] [facility], [facility] [place]. All rights reserved.  This information is not intended as a substitute for professional med · Over-the-counter cold medicines will not make the flu go away faster. But the medicines may help with coughing, sore throat, and congestion in your nose and sinuses. Don’t use a decongestant if you have high blood pressure.   · Stay home until your fever

## 2020-03-13 NOTE — PATIENT INSTRUCTIONS
Humidifier in room  Sleep propped  Push fluids  Limit dairy  Mucinex as directed  Sudafed as needed    Pharyngitis: Strep (Confirmed)    You have had a positive test for strep throat. Strep throat is a contagious illness.  It is spread by coughing, kissin · New or worsening ear pain, sinus pain, or headache  · Painful lumps in the back of neck  · Stiff neck  · Lymph nodes getting larger or becoming soft in the middle  · You can't swallow liquids or you can't open your mouth wide because of throat pain  · Si · Avoid being around cigarette smoke, whether yours or other people’s. · Acetaminophen or ibuprofen will help ease your fever, muscle aches, and headache. Don’t give aspirin to anyone younger than 25 who has the flu. Aspirin can harm the liver.   · Nausea © 6459-6807 [employer] [address]. [facility] [facility], [facility] [place]. All rights reserved. This information is not intended as a substitute for professional medical care. Always follow your healthcare professional's instructions.

## 2020-03-13 NOTE — TELEPHONE ENCOUNTER
Patient called stating that she was tested positive today at the [facility] and can not afford to bring in  and kids and wanted Rx's for the family.   I advised the patient that the [facility] can not send R'x for patients whom we have not seen/treated and patient

## 2020-04-08 ENCOUNTER — PATIENT MESSAGE (OUTPATIENT)
Dept: FAMILY MEDICINE CLINIC | Facility: CLINIC | Age: 25
End: 2020-04-08

## 2020-04-08 RX ORDER — NYSTATIN 100000 U/G
OINTMENT TOPICAL
Qty: 30 G | Refills: 2 | Status: SHIPPED | OUTPATIENT
Start: 2020-04-08 | End: 2020-06-12

## 2020-04-08 RX ORDER — NYSTATIN 100000 U/G
OINTMENT TOPICAL
Qty: 30 G | Refills: 2 | OUTPATIENT
Start: 2020-04-08

## 2020-04-08 NOTE — TELEPHONE ENCOUNTER
Dr. Jose Ambrocio    Pt sent a msg to gyne for the diflucan but she is asking you for a refill on the nystatin ointment    Please advise

## 2020-04-08 NOTE — TELEPHONE ENCOUNTER
From: Carissa Mccann  Sent: 4/8/2020 1:19 PM CDT  To: [facility] Clinical Staff  Subject: RE: Prescription Question    I sent a message to Dr. Destiney Joseph. ----- Message -----  From: Doroteo Emery  Sent: 4/8/20, 1:18 PM  To:  Carissa Mccann  Subject: RE: Pr

## 2020-06-12 ENCOUNTER — OFFICE VISIT (OUTPATIENT)
Dept: OBGYN CLINIC | Facility: CLINIC | Age: 25
End: 2020-06-12

## 2020-06-12 VITALS
BODY MASS INDEX: 46.82 KG/M2 | SYSTOLIC BLOOD PRESSURE: 118 MMHG | WEIGHT: 281 LBS | DIASTOLIC BLOOD PRESSURE: 76 MMHG | HEIGHT: 65 IN

## 2020-06-12 DIAGNOSIS — N76.0 VAGINITIS AND VULVOVAGINITIS: Primary | ICD-10-CM

## 2020-06-12 DIAGNOSIS — Z30.432 ENCOUNTER FOR REMOVAL OF INTRAUTERINE CONTRACEPTIVE DEVICE: ICD-10-CM

## 2020-06-12 DIAGNOSIS — L30.4 INTERTRIGO: ICD-10-CM

## 2020-06-12 PROCEDURE — 87480 CANDIDA DNA DIR PROBE: CPT | Performed by: NURSE PRACTITIONER

## 2020-06-12 PROCEDURE — 99213 OFFICE O/P EST LOW 20 MIN: CPT | Performed by: NURSE PRACTITIONER

## 2020-06-12 PROCEDURE — 87510 GARDNER VAG DNA DIR PROBE: CPT | Performed by: NURSE PRACTITIONER

## 2020-06-12 PROCEDURE — 87660 TRICHOMONAS VAGIN DIR PROBE: CPT | Performed by: NURSE PRACTITIONER

## 2020-06-12 PROCEDURE — 58301 REMOVE INTRAUTERINE DEVICE: CPT | Performed by: NURSE PRACTITIONER

## 2020-06-12 RX ORDER — KETOCONAZOLE 20 MG/G
1 CREAM TOPICAL DAILY
Qty: 60 G | Refills: 1 | Status: SHIPPED | OUTPATIENT
Start: 2020-06-12 | End: 2020-06-26

## 2020-06-12 NOTE — PROGRESS NOTES
Here for new gynecology visit. 22year old G 3 P 2. No LMP recorded. (Menstrual status: IUD - Intrauterine Device). .     Here for an ongoing and recurrent rash and vaginal/ vulvar itching.  She has been seen and treated by gynecology an dermatology in the 0 2      # Outcome Date GA Lbr Salvatore/2nd Weight Sex Delivery Anes PTL Lv   3 Term 08/09/15 38w0d  7 lb 6 oz (3.345 kg) M NORMAL SPONT   JB   2 SAB 2014           1 Term 03/14/13 39w0d  7 lb 3 oz (3.26 kg) F NORMAL SPONT   JB     ROS:    General:  No wt los

## 2020-07-07 ENCOUNTER — TELEPHONE (OUTPATIENT)
Dept: FAMILY MEDICINE CLINIC | Facility: CLINIC | Age: 25
End: 2020-07-07

## 2020-07-07 DIAGNOSIS — J45.40 MODERATE PERSISTENT ASTHMA WITHOUT COMPLICATION: ICD-10-CM

## 2020-07-07 RX ORDER — ALBUTEROL SULFATE 90 UG/1
AEROSOL, METERED RESPIRATORY (INHALATION)
Qty: 8.5 G | Refills: 5 | Status: SHIPPED | OUTPATIENT
Start: 2020-07-07 | End: 2021-09-01

## 2020-07-09 ENCOUNTER — HOSPITAL ENCOUNTER (EMERGENCY)
Age: 25
Discharge: HOME | End: 2020-07-09
Attending: EMERGENCY MEDICINE
Payer: OTHER MISCELLANEOUS

## 2020-07-09 ENCOUNTER — APPOINTMENT (OUTPATIENT)
Dept: GENERAL RADIOLOGY | Age: 25
End: 2020-07-09
Payer: OTHER MISCELLANEOUS

## 2020-07-09 VITALS
TEMPERATURE: 97 F | OXYGEN SATURATION: 97 % | SYSTOLIC BLOOD PRESSURE: 118 MMHG | HEART RATE: 86 BPM | BODY MASS INDEX: 47 KG/M2 | RESPIRATION RATE: 16 BRPM | WEIGHT: 281 LBS | DIASTOLIC BLOOD PRESSURE: 67 MMHG

## 2020-07-09 DIAGNOSIS — S93.401A SPRAIN OF RIGHT ANKLE, UNSPECIFIED LIGAMENT, INITIAL ENCOUNTER: Primary | ICD-10-CM

## 2020-07-09 PROCEDURE — 73610 X-RAY EXAM OF ANKLE: CPT

## 2020-07-09 PROCEDURE — 99283 EMERGENCY DEPT VISIT LOW MDM: CPT

## 2020-07-09 RX ORDER — IBUPROFEN 600 MG/1
600 TABLET ORAL ONCE
Status: COMPLETED | OUTPATIENT
Start: 2020-07-09 | End: 2020-07-09

## 2020-07-09 RX ORDER — HYDROCODONE BITARTRATE AND ACETAMINOPHEN 5; 325 MG/1; MG/1
1-2 TABLET ORAL EVERY 6 HOURS PRN
Qty: 10 TABLET | Refills: 0 | Status: SHIPPED | OUTPATIENT
Start: 2020-07-09 | End: 2020-07-16

## 2020-07-10 NOTE — ED PROVIDER NOTES
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 Given crutches and instruction. Initially to be nonweightbearing. Increase weightbearing as tolerated. Ice and elevation for the first 2 days. Follow-up as needed.   Patient does feel that

## 2020-10-05 ENCOUNTER — TELEPHONE (OUTPATIENT)
Dept: OBGYN CLINIC | Facility: CLINIC | Age: 25
End: 2020-10-05

## 2020-10-05 ENCOUNTER — LAB ENCOUNTER (OUTPATIENT)
Dept: LAB | Age: 25
End: 2020-10-05
Attending: NURSE PRACTITIONER
Payer: COMMERCIAL

## 2020-10-05 DIAGNOSIS — O09.299 HISTORY OF MISCARRIAGE, CURRENTLY PREGNANT: ICD-10-CM

## 2020-10-05 DIAGNOSIS — R73.03 PREDIABETES: ICD-10-CM

## 2020-10-05 DIAGNOSIS — O09.299 HISTORY OF MISCARRIAGE, CURRENTLY PREGNANT: Primary | ICD-10-CM

## 2020-10-05 PROCEDURE — 36415 COLL VENOUS BLD VENIPUNCTURE: CPT

## 2020-10-05 PROCEDURE — 84702 CHORIONIC GONADOTROPIN TEST: CPT

## 2020-10-05 PROCEDURE — 84144 ASSAY OF PROGESTERONE: CPT

## 2020-10-05 PROCEDURE — 83036 HEMOGLOBIN GLYCOSYLATED A1C: CPT

## 2020-10-05 NOTE — TELEPHONE ENCOUNTER
Returned pt's call.      LMP: uncertain   EDC based on lmp: unk    8 wks on unk    , sab x1    Are cycles regular?: irregular since IUD removal in 2020    Medical problems: asthma, pre-diabetic    Past sx hx: denies    Current medications: albuter

## 2020-10-06 ENCOUNTER — TELEPHONE (OUTPATIENT)
Dept: FAMILY MEDICINE CLINIC | Facility: CLINIC | Age: 25
End: 2020-10-06

## 2020-10-06 NOTE — PROGRESS NOTES
Patient informed of results. Verbalized understanding. No further questions or concerns. Call transferred to [facility] to schedule.

## 2020-10-13 ENCOUNTER — HOSPITAL ENCOUNTER (EMERGENCY)
Age: 25
Discharge: HOME | End: 2020-10-13
Attending: EMERGENCY MEDICINE
Payer: COMMERCIAL

## 2020-10-13 VITALS
OXYGEN SATURATION: 99 % | SYSTOLIC BLOOD PRESSURE: 127 MMHG | RESPIRATION RATE: 21 BRPM | HEART RATE: 91 BPM | WEIGHT: 273 LBS | BODY MASS INDEX: 45.48 KG/M2 | HEIGHT: 65 IN | TEMPERATURE: 99 F | DIASTOLIC BLOOD PRESSURE: 75 MMHG

## 2020-10-13 DIAGNOSIS — M79.601 PAIN, ARM, RIGHT: Primary | ICD-10-CM

## 2020-10-13 DIAGNOSIS — M54.12 CERVICAL RADICULOPATHY: ICD-10-CM

## 2020-10-13 PROCEDURE — 99283 EMERGENCY DEPT VISIT LOW MDM: CPT

## 2020-10-13 RX ORDER — ACETAMINOPHEN AND CODEINE PHOSPHATE 300; 30 MG/1; MG/1
1-2 TABLET ORAL EVERY 6 HOURS PRN
Qty: 10 TABLET | Refills: 0 | Status: SHIPPED | OUTPATIENT
Start: 2020-10-13 | End: 2020-10-20

## 2020-10-13 NOTE — TELEPHONE ENCOUNTER
Spoke with patient; she has  US and appt scheduled for 10/22/20. Pt is unsure of dates; Hcg on 10/5/20 62,467.0. Pt went to ER yesterday for arm pain. Pt was dx with possible cervical radiculopathy and prescribed Tylenol with Codeine.     Pt advised to

## 2020-10-13 NOTE — ED INITIAL ASSESSMENT (HPI)
C/o right shoulder pain that started this AM, felt ok throughout day, thought she slept on it wrong.  At 10 pm , pain got worse and she had a panic attack and came here

## 2020-10-13 NOTE — TELEPHONE ENCOUNTER
Patient was in ER yesterday and was told to call if has not heard from our office this morning. She is waiting for follow up call back from a nurse.     Thank you

## 2020-10-13 NOTE — TELEPHONE ENCOUNTER
Left message for patient to call back. Patient was seen in the ER for right arm pain. Suspected cervical radiculopathy. If patient does not have any OB concerns, should f/u with PCP.

## 2020-10-13 NOTE — ED PROVIDER NOTES
Patient Seen in: [facility] Emergency Department In [place]      History   Patient presents with:  Chest Pain Angina    Stated Complaint: RIGHT SHOULDER PAIN, 16 WEEKS PREGNANT    HPI    40-year-old female with history of asthma, approximate 16-week pregn Physical Exam  General: Alert and oriented. No acute distress. HEENT: Normocephalic. No evidence of trauma. Extraocular movements are intact. Nipples equal round active to light. Neck exam: Supple. There is no midline cervical spine tenderness. Impression:  Pain, arm, right  (primary encounter diagnosis)  Cervical radiculopathy    Disposition:  Discharge  10/13/2020  4:09 am    Follow-up:  Fox Tate MD  [address] [place] [address] [phone]    Schedule an appointment as soon as pos

## 2020-10-22 ENCOUNTER — ULTRASOUND ENCOUNTER (OUTPATIENT)
Dept: OBGYN CLINIC | Facility: CLINIC | Age: 25
End: 2020-10-22

## 2020-10-22 ENCOUNTER — OFFICE VISIT (OUTPATIENT)
Dept: OBGYN CLINIC | Facility: CLINIC | Age: 25
End: 2020-10-22

## 2020-10-22 VITALS
TEMPERATURE: 98 F | HEIGHT: 65 IN | HEART RATE: 94 BPM | SYSTOLIC BLOOD PRESSURE: 116 MMHG | DIASTOLIC BLOOD PRESSURE: 72 MMHG | WEIGHT: 274.38 LBS | BODY MASS INDEX: 45.72 KG/M2

## 2020-10-22 DIAGNOSIS — N91.1 SECONDARY AMENORRHEA: Primary | ICD-10-CM

## 2020-10-22 DIAGNOSIS — Z32.01 PREGNANCY EXAMINATION OR TEST, POSITIVE RESULT: ICD-10-CM

## 2020-10-22 DIAGNOSIS — Z78.9 DATE OF LAST MENSTRUAL PERIOD (LMP) UNKNOWN: ICD-10-CM

## 2020-10-22 PROCEDURE — 3008F BODY MASS INDEX DOCD: CPT | Performed by: OBSTETRICS & GYNECOLOGY

## 2020-10-22 PROCEDURE — 76856 US EXAM PELVIC COMPLETE: CPT | Performed by: OBSTETRICS & GYNECOLOGY

## 2020-10-22 PROCEDURE — 3078F DIAST BP <80 MM HG: CPT | Performed by: OBSTETRICS & GYNECOLOGY

## 2020-10-22 PROCEDURE — 99213 OFFICE O/P EST LOW 20 MIN: CPT | Performed by: OBSTETRICS & GYNECOLOGY

## 2020-10-22 PROCEDURE — 3074F SYST BP LT 130 MM HG: CPT | Performed by: OBSTETRICS & GYNECOLOGY

## 2020-10-22 RX ORDER — GLYCERIN/MINERAL OIL
LOTION (ML) TOPICAL
COMMUNITY
End: 2021-04-20

## 2020-10-22 NOTE — PROGRESS NOTES
[facility]  Obstetrics and Gynecology    Subjective:     Adam Garcia is a 22year old  female presents with c/o secondary amenorrhea and positive pregnancy test. The patient was recommended to return for further evaluation and a

## 2020-11-05 PROBLEM — O09.30 LATE PRENATAL CARE AFFECTING PREGNANCY, ANTEPARTUM (HCC): Status: ACTIVE | Noted: 2020-11-05

## 2020-11-05 PROBLEM — O09.30 LATE PRENATAL CARE AFFECTING PREGNANCY, ANTEPARTUM: Status: ACTIVE | Noted: 2020-11-05

## 2020-11-06 ENCOUNTER — TELEPHONE (OUTPATIENT)
Dept: PERINATAL CARE | Facility: HOSPITAL | Age: 25
End: 2020-11-06

## 2020-11-06 ENCOUNTER — INITIAL PRENATAL (OUTPATIENT)
Dept: OBGYN CLINIC | Facility: CLINIC | Age: 25
End: 2020-11-06

## 2020-11-06 ENCOUNTER — LAB ENCOUNTER (OUTPATIENT)
Dept: LAB | Age: 25
End: 2020-11-06
Attending: OBSTETRICS & GYNECOLOGY
Payer: COMMERCIAL

## 2020-11-06 VITALS
HEART RATE: 96 BPM | HEIGHT: 65 IN | SYSTOLIC BLOOD PRESSURE: 110 MMHG | DIASTOLIC BLOOD PRESSURE: 62 MMHG | BODY MASS INDEX: 45.98 KG/M2 | WEIGHT: 276 LBS

## 2020-11-06 DIAGNOSIS — Z36.9 PRENATAL SCREENING ENCOUNTER: ICD-10-CM

## 2020-11-06 DIAGNOSIS — Z36.9 PRENATAL SCREENING ENCOUNTER: Primary | ICD-10-CM

## 2020-11-06 DIAGNOSIS — R63.8 INCREASED BMI: ICD-10-CM

## 2020-11-06 DIAGNOSIS — O24.319 PREGESTATIONAL DIABETES MELLITUS, MODIFIED WHITE CLASS B: ICD-10-CM

## 2020-11-06 PROCEDURE — 81511 FTL CGEN ABNOR FOUR ANAL: CPT

## 2020-11-06 PROCEDURE — 86900 BLOOD TYPING SEROLOGIC ABO: CPT

## 2020-11-06 PROCEDURE — 86780 TREPONEMA PALLIDUM: CPT

## 2020-11-06 PROCEDURE — 87086 URINE CULTURE/COLONY COUNT: CPT | Performed by: OBSTETRICS & GYNECOLOGY

## 2020-11-06 PROCEDURE — 36415 COLL VENOUS BLD VENIPUNCTURE: CPT

## 2020-11-06 PROCEDURE — 87340 HEPATITIS B SURFACE AG IA: CPT

## 2020-11-06 PROCEDURE — 3008F BODY MASS INDEX DOCD: CPT | Performed by: OBSTETRICS & GYNECOLOGY

## 2020-11-06 PROCEDURE — 87389 HIV-1 AG W/HIV-1&-2 AB AG IA: CPT

## 2020-11-06 PROCEDURE — 81002 URINALYSIS NONAUTO W/O SCOPE: CPT | Performed by: OBSTETRICS & GYNECOLOGY

## 2020-11-06 PROCEDURE — 3074F SYST BP LT 130 MM HG: CPT | Performed by: OBSTETRICS & GYNECOLOGY

## 2020-11-06 PROCEDURE — 85025 COMPLETE CBC W/AUTO DIFF WBC: CPT

## 2020-11-06 PROCEDURE — 86762 RUBELLA ANTIBODY: CPT

## 2020-11-06 PROCEDURE — 86901 BLOOD TYPING SEROLOGIC RH(D): CPT

## 2020-11-06 PROCEDURE — 3078F DIAST BP <80 MM HG: CPT | Performed by: OBSTETRICS & GYNECOLOGY

## 2020-11-06 PROCEDURE — 86850 RBC ANTIBODY SCREEN: CPT

## 2020-11-06 NOTE — PROGRESS NOTES
Subjective:  Patient presents with:  New Patient: Darshana Lux patient     22year old  female  [id number]  presents for new OB visit    No LMP recorded (lmp unknown). Patient is pregnant.  Estimated Date of Delivery: 4/3/21   Patient without complaints since finding o Alcohol use: Not Currently      Alcohol/week: 0.0 standard drinks      Comment: occ      Family History:  Family History   Problem Relation Age of Onset   • Diabetes Father    • Diabetes Maternal Grandmother    • High Blood Pressure Maternal Grandmother GAYE 4/3/21  Amenorrhea, positive pregnancy test.  Current problems for this pregnancy:   Late prenatal care, dating by 16 week ultrasound. Recommend NST 38 weeks  Increased BMI- NST 36 weeks  Pre-gestational diabetes- referral diabetic educator.   Case dis

## 2020-11-06 NOTE — PATIENT INSTRUCTIONS
[facility]- Prenatal Testing    The following information is designed to help you understand some of the optional tests that are available to you to screen for problems in your pregnancy.  Before considering any of these tests, you will need to number below: Please verify insurance coverage:  CPT code: 53315 (huang) or 26840 (twins)  Diagnosis: Genetic Screening- Z36.8A  Maternal Fetal Medicine  Dr. Phylicia Cody, Dr. Harshil Lew, Dr. Alicia Rivero and Dr. Chrissie Gomez [facility] [address] [place], [address] [facility] [address]  Diagnosis code: Genetic screening- Z36.8A    [5] Alpha Fetoprotein (AFP, MSAFP)- This is a simple blood test that screens for neural tube defects such as spina bifida (an abnormal opening in the spine). It is usually performed around 16-20 weeks.  Ad test, Quad Screen and Cystic Fibrosis test should be in your prenatal packet. Your doctor will discuss this information in more detail, and feel free to ask additional questions.  Also, remember that all of these tests are optional, and will only be perform

## 2020-11-07 PROBLEM — O09.899 RUBELLA NON-IMMUNE STATUS, ANTEPARTUM: Status: ACTIVE | Noted: 2020-11-07

## 2020-11-07 PROBLEM — O99.891 RUBELLA NON-IMMUNE STATUS, ANTEPARTUM: Status: ACTIVE | Noted: 2020-11-07

## 2020-11-07 PROBLEM — Z28.3 RUBELLA NON-IMMUNE STATUS, ANTEPARTUM: Status: ACTIVE | Noted: 2020-11-07

## 2020-11-07 PROBLEM — Z28.39 RUBELLA NON-IMMUNE STATUS, ANTEPARTUM: Status: ACTIVE | Noted: 2020-11-07

## 2020-11-07 PROBLEM — O09.899 RUBELLA NON-IMMUNE STATUS, ANTEPARTUM (HCC): Status: ACTIVE | Noted: 2020-11-07

## 2020-11-07 PROBLEM — Z28.39 RUBELLA NON-IMMUNE STATUS, ANTEPARTUM (HCC): Status: ACTIVE | Noted: 2020-11-07

## 2020-11-09 ENCOUNTER — TELEPHONE (OUTPATIENT)
Dept: OBGYN CLINIC | Facility: CLINIC | Age: 25
End: 2020-11-09

## 2020-11-09 DIAGNOSIS — O28.0 ABNORMAL QUAD SCREEN: Primary | ICD-10-CM

## 2020-11-09 NOTE — TELEPHONE ENCOUNTER
Order placed. Dave Alves you please have someone fill out paperwork. thanks     Called patient. Discussed that test has to be drawn in the [place] lab and requisition form needs to be picked up from the office to take to the lab.   Pt will  in [facility]

## 2020-11-09 NOTE — TELEPHONE ENCOUNTER
Call received from [place] in [facility]. Pt was referred to [facility] for abnormal quad screen. Pt has appt on 11/16/20 for Level II US; was advised by Giovani Nunez in [facility] to keep that appt and can discuss abnormal results at that time.     Per [place], [facility] recommends the pa

## 2020-11-09 NOTE — TELEPHONE ENCOUNTER
Answered all questions from patient and . Test is scheduled for 9:45 tomorrow. Called P to expedite approval; spoke with Christy Pugh.   She will forward to medical director now to review for approval.  Genetic testing referrals go to medical director

## 2020-11-10 ENCOUNTER — LAB ENCOUNTER (OUTPATIENT)
Dept: LAB | Age: 25
End: 2020-11-10
Attending: OBSTETRICS & GYNECOLOGY
Payer: COMMERCIAL

## 2020-11-10 DIAGNOSIS — O28.0 MATERNAL SERUM ALPHA-FETOPROTEIN HIGH: Primary | ICD-10-CM

## 2020-11-11 ENCOUNTER — NURSE ONLY (OUTPATIENT)
Dept: ENDOCRINOLOGY CLINIC | Facility: CLINIC | Age: 25
End: 2020-11-11

## 2020-11-11 DIAGNOSIS — O24.319 PREGESTATIONAL DIABETES MELLITUS, MODIFIED WHITE CLASS B: ICD-10-CM

## 2020-11-11 PROCEDURE — 3078F DIAST BP <80 MM HG: CPT | Performed by: DIETITIAN, REGISTERED

## 2020-11-11 PROCEDURE — 3074F SYST BP LT 130 MM HG: CPT | Performed by: DIETITIAN, REGISTERED

## 2020-11-11 PROCEDURE — G0108 DIAB MANAGE TRN  PER INDIV: HCPCS | Performed by: DIETITIAN, REGISTERED

## 2020-11-11 NOTE — PROGRESS NOTES
Adam Garcia  HAM:[birth year] was seen for Gestational Diabetes Counseling: Individual/Group    Date: 11/11/2020  Referring Provider: Amaury Finley  Start time:9:30am  End time: 11:00am    Assessment: Wt 278 lb 1.6 oz (126.1 kg)   LMP  (LMP Unknown) hypoglycemia, sick day, DKA, other) and when to call provider. Post pregnancy management/prevention of Type 2 DM, and increased risk of having diabetes later in life reviewed. Healthy Coping:  Family involvement/social support encouraged.   Tobias Hatchet

## 2020-11-12 VITALS
WEIGHT: 278.13 LBS | BODY MASS INDEX: 46 KG/M2 | HEART RATE: 96 BPM | TEMPERATURE: 97 F | SYSTOLIC BLOOD PRESSURE: 105 MMHG | DIASTOLIC BLOOD PRESSURE: 69 MMHG

## 2020-11-16 ENCOUNTER — TELEPHONE (OUTPATIENT)
Dept: OBGYN CLINIC | Facility: CLINIC | Age: 25
End: 2020-11-16

## 2020-11-16 NOTE — TELEPHONE ENCOUNTER
Normal MaternIT 21 results. FYI male gender noted. Results will be given to RN in office to notify patient.

## 2020-11-16 NOTE — TELEPHONE ENCOUNTER
Received [id number] test results.  Placed on [facility] desk for review in [place]

## 2020-12-04 ENCOUNTER — ROUTINE PRENATAL (OUTPATIENT)
Dept: OBGYN CLINIC | Facility: CLINIC | Age: 25
End: 2020-12-04

## 2020-12-04 VITALS — DIASTOLIC BLOOD PRESSURE: 76 MMHG | BODY MASS INDEX: 46 KG/M2 | SYSTOLIC BLOOD PRESSURE: 122 MMHG | WEIGHT: 278 LBS

## 2020-12-04 DIAGNOSIS — O09.629 SUPERVISION OF HIGH-RISK PREGNANCY OF YOUNG MULTIGRAVIDA: ICD-10-CM

## 2020-12-04 DIAGNOSIS — O24.319 PREGESTATIONAL DIABETES MELLITUS, MODIFIED WHITE CLASS B: ICD-10-CM

## 2020-12-04 DIAGNOSIS — Z34.80 SUPERVISION OF OTHER NORMAL PREGNANCY: Primary | ICD-10-CM

## 2020-12-04 PROCEDURE — 3074F SYST BP LT 130 MM HG: CPT | Performed by: NURSE PRACTITIONER

## 2020-12-04 PROCEDURE — 3078F DIAST BP <80 MM HG: CPT | Performed by: NURSE PRACTITIONER

## 2020-12-04 PROCEDURE — 81002 URINALYSIS NONAUTO W/O SCOPE: CPT | Performed by: NURSE PRACTITIONER

## 2020-12-04 NOTE — PROGRESS NOTES
DESHAWN  Doing well- some vulvar irritation again like per her history- ok to use Rx prior as directed  FM is good  Reviewed s/sx  labor  RH positive  S/P Anatomy scan with MFM- she will see then for fetal echo in the next few weeks  They are monitoring

## 2020-12-14 ENCOUNTER — TELEPHONE (OUTPATIENT)
Dept: OBGYN CLINIC | Facility: CLINIC | Age: 25
End: 2020-12-14

## 2020-12-14 NOTE — TELEPHONE ENCOUNTER
Pt called stating she couldn't go get her US done without getting a test because she has a low grade fever. She is wanting to know what she's suppose to do.  Please advise

## 2020-12-14 NOTE — TELEPHONE ENCOUNTER
24w2d gestation, last seen 12/4/20, next appt 1/4/21. Pt had appt with MFM today but was not seen due to report of fever yesterday.   Pt was scheduled for a fetal echocardiogram. Pt states MFM said they will not see her without a negative test.  Pt reports

## 2020-12-21 ENCOUNTER — LAB ENCOUNTER (OUTPATIENT)
Dept: LAB | Age: 25
End: 2020-12-21
Attending: FAMILY MEDICINE
Payer: COMMERCIAL

## 2020-12-21 DIAGNOSIS — R50.9 FEVER, UNSPECIFIED FEVER CAUSE: ICD-10-CM

## 2020-12-23 NOTE — TELEPHONE ENCOUNTER
Test completed and negative for Covid on 12/21/20. Per documentation, patient reported fever on 12/13/20. Next appointment is scheduled for 1/4/21. Call to patient; no answer. Left message to call back.      Will check with patient to see if any new

## 2021-01-04 ENCOUNTER — ROUTINE PRENATAL (OUTPATIENT)
Dept: OBGYN CLINIC | Facility: CLINIC | Age: 26
End: 2021-01-04

## 2021-01-04 VITALS — DIASTOLIC BLOOD PRESSURE: 70 MMHG | BODY MASS INDEX: 47 KG/M2 | WEIGHT: 284 LBS | SYSTOLIC BLOOD PRESSURE: 118 MMHG

## 2021-01-04 DIAGNOSIS — Z34.80 SUPERVISION OF OTHER NORMAL PREGNANCY: Primary | ICD-10-CM

## 2021-01-04 DIAGNOSIS — O09.629 SUPERVISION OF HIGH-RISK PREGNANCY OF YOUNG MULTIGRAVIDA: ICD-10-CM

## 2021-01-04 LAB — MULTISTIX LOT#: 6038 NUMERIC

## 2021-01-04 PROCEDURE — 3078F DIAST BP <80 MM HG: CPT | Performed by: OBSTETRICS & GYNECOLOGY

## 2021-01-04 PROCEDURE — 81002 URINALYSIS NONAUTO W/O SCOPE: CPT | Performed by: OBSTETRICS & GYNECOLOGY

## 2021-01-04 PROCEDURE — 3074F SYST BP LT 130 MM HG: CPT | Performed by: OBSTETRICS & GYNECOLOGY

## 2021-01-04 NOTE — PROGRESS NOTES
Has MFM scan for fetal echo scheduled  Had negative [id number] after positive Quad screen  Glucose doing well, MFM managing  Has FM  RUQ pain, ? Ribs. Intermittent, not related to meals. No pain on palpation, discussed.  Call if worse  4 weeks

## 2021-01-05 DIAGNOSIS — L30.4 INTERTRIGO: ICD-10-CM

## 2021-02-01 ENCOUNTER — ROUTINE PRENATAL (OUTPATIENT)
Dept: OBGYN CLINIC | Facility: CLINIC | Age: 26
End: 2021-02-01

## 2021-02-01 VITALS
HEART RATE: 101 BPM | DIASTOLIC BLOOD PRESSURE: 70 MMHG | HEIGHT: 65 IN | BODY MASS INDEX: 48.82 KG/M2 | WEIGHT: 293 LBS | SYSTOLIC BLOOD PRESSURE: 118 MMHG

## 2021-02-01 DIAGNOSIS — O24.319 PREGESTATIONAL DIABETES MELLITUS, MODIFIED WHITE CLASS B: ICD-10-CM

## 2021-02-01 DIAGNOSIS — Z34.80 SUPERVISION OF OTHER NORMAL PREGNANCY: Primary | ICD-10-CM

## 2021-02-01 DIAGNOSIS — O09.629 SUPERVISION OF HIGH-RISK PREGNANCY OF YOUNG MULTIGRAVIDA: ICD-10-CM

## 2021-02-01 DIAGNOSIS — O28.0 ABNORMAL QUAD SCREEN: ICD-10-CM

## 2021-02-01 LAB
GLUCOSE (URINE DIPSTICK): NEGATIVE MG/DL
MULTISTIX LOT#: 5073 NUMERIC

## 2021-02-01 PROCEDURE — 3008F BODY MASS INDEX DOCD: CPT | Performed by: NURSE PRACTITIONER

## 2021-02-01 PROCEDURE — 3074F SYST BP LT 130 MM HG: CPT | Performed by: NURSE PRACTITIONER

## 2021-02-01 PROCEDURE — 3078F DIAST BP <80 MM HG: CPT | Performed by: NURSE PRACTITIONER

## 2021-02-01 PROCEDURE — 81002 URINALYSIS NONAUTO W/O SCOPE: CPT | Performed by: NURSE PRACTITIONER

## 2021-02-01 RX ORDER — KETOCONAZOLE 20 MG/G
CREAM TOPICAL
Status: ON HOLD | COMMUNITY
Start: 2021-01-05 | End: 2021-02-17

## 2021-02-01 NOTE — PROGRESS NOTES
DESHAWN  Doing well,  GOOD FM  Denies VB/LOF/uctx  She will do TDAP next visit  Encouraged she complete labs  RTC in 1 week with NST  Fetal movement discussed

## 2021-02-08 ENCOUNTER — ROUTINE PRENATAL (OUTPATIENT)
Dept: OBGYN CLINIC | Facility: CLINIC | Age: 26
End: 2021-02-08

## 2021-02-08 ENCOUNTER — APPOINTMENT (OUTPATIENT)
Dept: OBGYN CLINIC | Facility: CLINIC | Age: 26
End: 2021-02-08

## 2021-02-08 VITALS — DIASTOLIC BLOOD PRESSURE: 82 MMHG | WEIGHT: 293 LBS | SYSTOLIC BLOOD PRESSURE: 130 MMHG | BODY MASS INDEX: 51 KG/M2

## 2021-02-08 DIAGNOSIS — R63.8 INCREASED BMI: ICD-10-CM

## 2021-02-08 DIAGNOSIS — O09.30 LATE PRENATAL CARE AFFECTING PREGNANCY, ANTEPARTUM: ICD-10-CM

## 2021-02-08 DIAGNOSIS — Z34.00 SUPERVISION OF NORMAL FIRST PREGNANCY, ANTEPARTUM: Primary | ICD-10-CM

## 2021-02-08 DIAGNOSIS — O09.629 SUPERVISION OF HIGH-RISK PREGNANCY OF YOUNG MULTIGRAVIDA: ICD-10-CM

## 2021-02-08 DIAGNOSIS — O24.319 PREGESTATIONAL DIABETES MELLITUS, MODIFIED WHITE CLASS B: ICD-10-CM

## 2021-02-08 LAB
BASOPHILS # BLD AUTO: 0.03 X10(3) UL (ref 0–0.2)
BASOPHILS NFR BLD AUTO: 0.3 %
DEPRECATED RDW RBC AUTO: 38.3 FL (ref 35.1–46.3)
EOSINOPHIL # BLD AUTO: 0.21 X10(3) UL (ref 0–0.7)
EOSINOPHIL NFR BLD AUTO: 2.3 %
ERYTHROCYTE [DISTWIDTH] IN BLOOD BY AUTOMATED COUNT: 12.7 % (ref 11–15)
HCT VFR BLD AUTO: 34.7 %
HGB BLD-MCNC: 11.5 G/DL
IMM GRANULOCYTES # BLD AUTO: 0.05 X10(3) UL (ref 0–1)
IMM GRANULOCYTES NFR BLD: 0.5 %
LYMPHOCYTES # BLD AUTO: 2.12 X10(3) UL (ref 1–4)
LYMPHOCYTES NFR BLD AUTO: 22.9 %
MCH RBC QN AUTO: 27.8 PG (ref 26–34)
MCHC RBC AUTO-ENTMCNC: 33.1 G/DL (ref 31–37)
MCV RBC AUTO: 84 FL
MONOCYTES # BLD AUTO: 0.46 X10(3) UL (ref 0.1–1)
MONOCYTES NFR BLD AUTO: 5 %
MULTISTIX LOT#: 6038 NUMERIC
NEUTROPHILS # BLD AUTO: 6.4 X10 (3) UL (ref 1.5–7.7)
NEUTROPHILS # BLD AUTO: 6.4 X10(3) UL (ref 1.5–7.7)
NEUTROPHILS NFR BLD AUTO: 69 %
PLATELET # BLD AUTO: 188 10(3)UL (ref 150–450)
RBC # BLD AUTO: 4.13 X10(6)UL
WBC # BLD AUTO: 9.3 X10(3) UL (ref 4–11)

## 2021-02-08 PROCEDURE — 87389 HIV-1 AG W/HIV-1&-2 AB AG IA: CPT | Performed by: NURSE PRACTITIONER

## 2021-02-08 PROCEDURE — 59025 FETAL NON-STRESS TEST: CPT | Performed by: OBSTETRICS & GYNECOLOGY

## 2021-02-08 PROCEDURE — 81002 URINALYSIS NONAUTO W/O SCOPE: CPT | Performed by: OBSTETRICS & GYNECOLOGY

## 2021-02-08 PROCEDURE — 3075F SYST BP GE 130 - 139MM HG: CPT | Performed by: OBSTETRICS & GYNECOLOGY

## 2021-02-08 PROCEDURE — 85025 COMPLETE CBC W/AUTO DIFF WBC: CPT | Performed by: NURSE PRACTITIONER

## 2021-02-08 PROCEDURE — 3079F DIAST BP 80-89 MM HG: CPT | Performed by: OBSTETRICS & GYNECOLOGY

## 2021-02-08 NOTE — PROGRESS NOTES
DESHAWN 32w2d    Doing ok. Having intermittent pelvic pressure and cramping. Did not have this with other kids. Wonders if she needs to go on bedrest. +FM  No LOF, VB  SVE: external os 1 cm, internal os closed/long/high    1. FHT-NST reactive  2.  PNL:  Still h

## 2021-02-15 ENCOUNTER — NURSE ONLY (OUTPATIENT)
Dept: OBGYN CLINIC | Facility: CLINIC | Age: 26
End: 2021-02-15

## 2021-02-15 ENCOUNTER — ROUTINE PRENATAL (OUTPATIENT)
Dept: OBGYN CLINIC | Facility: CLINIC | Age: 26
End: 2021-02-15

## 2021-02-15 VITALS
DIASTOLIC BLOOD PRESSURE: 88 MMHG | BODY MASS INDEX: 48.82 KG/M2 | HEIGHT: 65 IN | WEIGHT: 293 LBS | SYSTOLIC BLOOD PRESSURE: 142 MMHG

## 2021-02-15 DIAGNOSIS — O09.30 LATE PRENATAL CARE AFFECTING PREGNANCY, ANTEPARTUM: Primary | ICD-10-CM

## 2021-02-15 DIAGNOSIS — O24.319 PREGESTATIONAL DIABETES MELLITUS, MODIFIED WHITE CLASS B: ICD-10-CM

## 2021-02-15 DIAGNOSIS — J45.40 MODERATE PERSISTENT ASTHMA WITHOUT COMPLICATION: ICD-10-CM

## 2021-02-15 LAB
ALBUMIN SERPL-MCNC: 2.7 G/DL (ref 3.4–5)
ALBUMIN/GLOB SERPL: 0.9 {RATIO} (ref 1–2)
ALP LIVER SERPL-CCNC: 80 U/L
ALT SERPL-CCNC: 16 U/L
ANION GAP SERPL CALC-SCNC: 8 MMOL/L (ref 0–18)
AST SERPL-CCNC: 16 U/L (ref 15–37)
BASOPHILS # BLD AUTO: 0.03 X10(3) UL (ref 0–0.2)
BASOPHILS NFR BLD AUTO: 0.3 %
BILIRUB SERPL-MCNC: 0.1 MG/DL (ref 0.1–2)
BUN BLD-MCNC: 14 MG/DL (ref 7–18)
BUN/CREAT SERPL: 20.9 (ref 10–20)
CALCIUM BLD-MCNC: 9 MG/DL (ref 8.5–10.1)
CHLORIDE SERPL-SCNC: 109 MMOL/L (ref 98–112)
CO2 SERPL-SCNC: 23 MMOL/L (ref 21–32)
CREAT BLD-MCNC: 0.67 MG/DL
CREAT UR-SCNC: 254 MG/DL
DEPRECATED RDW RBC AUTO: 39.6 FL (ref 35.1–46.3)
EOSINOPHIL # BLD AUTO: 0.18 X10(3) UL (ref 0–0.7)
EOSINOPHIL NFR BLD AUTO: 1.9 %
ERYTHROCYTE [DISTWIDTH] IN BLOOD BY AUTOMATED COUNT: 13.2 % (ref 11–15)
GLOBULIN PLAS-MCNC: 3 G/DL (ref 2.8–4.4)
GLUCOSE BLD-MCNC: 88 MG/DL (ref 70–99)
HCT VFR BLD AUTO: 35.2 %
HGB BLD-MCNC: 11.8 G/DL
IMM GRANULOCYTES # BLD AUTO: 0.08 X10(3) UL (ref 0–1)
IMM GRANULOCYTES NFR BLD: 0.9 %
LYMPHOCYTES # BLD AUTO: 1.55 X10(3) UL (ref 1–4)
LYMPHOCYTES NFR BLD AUTO: 16.7 %
M PROTEIN MFR SERPL ELPH: 5.7 G/DL (ref 6.4–8.2)
MCH RBC QN AUTO: 28 PG (ref 26–34)
MCHC RBC AUTO-ENTMCNC: 33.5 G/DL (ref 31–37)
MCV RBC AUTO: 83.6 FL
MONOCYTES # BLD AUTO: 0.56 X10(3) UL (ref 0.1–1)
MONOCYTES NFR BLD AUTO: 6 %
MULTISTIX LOT#: 6038 NUMERIC
NEUTROPHILS # BLD AUTO: 6.86 X10 (3) UL (ref 1.5–7.7)
NEUTROPHILS # BLD AUTO: 6.86 X10(3) UL (ref 1.5–7.7)
NEUTROPHILS NFR BLD AUTO: 74.2 %
OSMOLALITY SERPL CALC.SUM OF ELEC: 290 MOSM/KG (ref 275–295)
PATIENT FASTING Y/N/NP: NO
PLATELET # BLD AUTO: 174 10(3)UL (ref 150–450)
POTASSIUM SERPL-SCNC: 3.8 MMOL/L (ref 3.5–5.1)
PROT UR-MCNC: 152.9 MG/DL
PROT/CREAT UR-RTO: 0.6
PROTEIN (URINE DIPSTICK): 2000 MG/DL
RBC # BLD AUTO: 4.21 X10(6)UL
SODIUM SERPL-SCNC: 140 MMOL/L (ref 136–145)
WBC # BLD AUTO: 9.3 X10(3) UL (ref 4–11)

## 2021-02-15 PROCEDURE — 90715 TDAP VACCINE 7 YRS/> IM: CPT | Performed by: OBSTETRICS & GYNECOLOGY

## 2021-02-15 PROCEDURE — 80053 COMPREHEN METABOLIC PANEL: CPT | Performed by: OBSTETRICS & GYNECOLOGY

## 2021-02-15 PROCEDURE — 85025 COMPLETE CBC W/AUTO DIFF WBC: CPT | Performed by: OBSTETRICS & GYNECOLOGY

## 2021-02-15 PROCEDURE — 3008F BODY MASS INDEX DOCD: CPT | Performed by: OBSTETRICS & GYNECOLOGY

## 2021-02-15 PROCEDURE — 82570 ASSAY OF URINE CREATININE: CPT | Performed by: OBSTETRICS & GYNECOLOGY

## 2021-02-15 PROCEDURE — 59025 FETAL NON-STRESS TEST: CPT | Performed by: OBSTETRICS & GYNECOLOGY

## 2021-02-15 PROCEDURE — 3079F DIAST BP 80-89 MM HG: CPT | Performed by: OBSTETRICS & GYNECOLOGY

## 2021-02-15 PROCEDURE — 81002 URINALYSIS NONAUTO W/O SCOPE: CPT | Performed by: OBSTETRICS & GYNECOLOGY

## 2021-02-15 PROCEDURE — 84156 ASSAY OF PROTEIN URINE: CPT | Performed by: OBSTETRICS & GYNECOLOGY

## 2021-02-15 PROCEDURE — 3077F SYST BP >= 140 MM HG: CPT | Performed by: OBSTETRICS & GYNECOLOGY

## 2021-02-15 PROCEDURE — 90471 IMMUNIZATION ADMIN: CPT | Performed by: OBSTETRICS & GYNECOLOGY

## 2021-02-16 ENCOUNTER — TELEPHONE (OUTPATIENT)
Dept: OBGYN CLINIC | Facility: CLINIC | Age: 26
End: 2021-02-16

## 2021-02-16 PROBLEM — O16.3 ELEVATED BLOOD PRESSURE AFFECTING PREGNANCY IN THIRD TRIMESTER, ANTEPARTUM: Status: ACTIVE | Noted: 2021-02-16

## 2021-02-16 PROBLEM — O16.3 ELEVATED BLOOD PRESSURE AFFECTING PREGNANCY IN THIRD TRIMESTER, ANTEPARTUM (HCC): Status: ACTIVE | Noted: 2021-02-16

## 2021-02-16 NOTE — PROGRESS NOTES
Patient notified. Patient verbalized understanding. Pt states she tried using her dad's BP machine and she needs a large cuff. Pt is going to order from [place] and will not get it until next week.    She will check tonight at a few pharmacies to see if

## 2021-02-16 NOTE — PROGRESS NOTES
DESHAWN - 33w3d  Doing well, +FM  MFM for growth US today   She does not have BG log with her but stated it has been ok - encouraged patient to share BG with MFM and our office   Denies LOF/VB/uctx  Denied Visual changes or RUQ pain. No HA.    /88   Ht 65

## 2021-02-16 NOTE — TELEPHONE ENCOUNTER
Patient saw on my chart and would like a phone call back regarding test results is very concerned     Thank you

## 2021-02-16 NOTE — PROGRESS NOTES
[address] labs with elevated Protein creatinine ratio   Will repeat PIH labs in one week.   Pt needs to take BP at home as directed and share results with us on Thursday to determine if she needs to be seen sooner than Monday

## 2021-02-17 ENCOUNTER — HOSPITAL ENCOUNTER (OUTPATIENT)
Facility: HOSPITAL | Age: 26
Setting detail: OBSERVATION
Discharge: HOME | End: 2021-02-19
Attending: OBSTETRICS & GYNECOLOGY | Admitting: OBSTETRICS & GYNECOLOGY
Payer: COMMERCIAL

## 2021-02-17 PROBLEM — O16.9 ELEVATED BLOOD PRESSURE AFFECTING PREGNANCY, ANTEPARTUM: Status: ACTIVE | Noted: 2021-02-16

## 2021-02-17 PROBLEM — O16.9 ELEVATED BLOOD PRESSURE AFFECTING PREGNANCY, ANTEPARTUM (HCC): Status: ACTIVE | Noted: 2021-02-16

## 2021-02-17 PROBLEM — Z34.90 PREGNANCY: Status: ACTIVE | Noted: 2021-02-17

## 2021-02-17 PROBLEM — Z34.90 PREGNANCY (HCC): Status: ACTIVE | Noted: 2021-02-17

## 2021-02-17 LAB
ALBUMIN SERPL-MCNC: 2.5 G/DL (ref 3.4–5)
ALBUMIN/GLOB SERPL: 0.8 {RATIO} (ref 1–2)
ALP LIVER SERPL-CCNC: 76 U/L
ALT SERPL-CCNC: 15 U/L
ANION GAP SERPL CALC-SCNC: 5 MMOL/L (ref 0–18)
AST SERPL-CCNC: 22 U/L (ref 15–37)
BASOPHILS # BLD AUTO: 0.03 X10(3) UL (ref 0–0.2)
BASOPHILS NFR BLD AUTO: 0.3 %
BILIRUB SERPL-MCNC: 0.3 MG/DL (ref 0.1–2)
BUN BLD-MCNC: 13 MG/DL (ref 7–18)
BUN/CREAT SERPL: 17.1 (ref 10–20)
CALCIUM BLD-MCNC: 8.8 MG/DL (ref 8.5–10.1)
CHLORIDE SERPL-SCNC: 109 MMOL/L (ref 98–112)
CO2 SERPL-SCNC: 25 MMOL/L (ref 21–32)
CREAT BLD-MCNC: 0.76 MG/DL
DEPRECATED RDW RBC AUTO: 38.5 FL (ref 35.1–46.3)
EOSINOPHIL # BLD AUTO: 0.21 X10(3) UL (ref 0–0.7)
EOSINOPHIL NFR BLD AUTO: 2.3 %
ERYTHROCYTE [DISTWIDTH] IN BLOOD BY AUTOMATED COUNT: 13.1 % (ref 11–15)
EST. AVERAGE GLUCOSE BLD GHB EST-MCNC: 114 MG/DL (ref 68–126)
GLOBULIN PLAS-MCNC: 3.2 G/DL (ref 2.8–4.4)
GLUCOSE BLD-MCNC: 116 MG/DL (ref 70–99)
GLUCOSE BLD-MCNC: 71 MG/DL (ref 70–99)
GLUCOSE BLD-MCNC: 94 MG/DL (ref 70–99)
HBA1C MFR BLD HPLC: 5.6 % (ref ?–5.7)
HCT VFR BLD AUTO: 33.9 %
HGB BLD-MCNC: 11.5 G/DL
IMM GRANULOCYTES # BLD AUTO: 0.08 X10(3) UL (ref 0–1)
IMM GRANULOCYTES NFR BLD: 0.9 %
LYMPHOCYTES # BLD AUTO: 1.49 X10(3) UL (ref 1–4)
LYMPHOCYTES NFR BLD AUTO: 16.3 %
M PROTEIN MFR SERPL ELPH: 5.7 G/DL (ref 6.4–8.2)
MCH RBC QN AUTO: 28 PG (ref 26–34)
MCHC RBC AUTO-ENTMCNC: 33.9 G/DL (ref 31–37)
MCV RBC AUTO: 82.7 FL
MONOCYTES # BLD AUTO: 0.54 X10(3) UL (ref 0.1–1)
MONOCYTES NFR BLD AUTO: 5.9 %
NEUTROPHILS # BLD AUTO: 6.77 X10 (3) UL (ref 1.5–7.7)
NEUTROPHILS # BLD AUTO: 6.77 X10(3) UL (ref 1.5–7.7)
NEUTROPHILS NFR BLD AUTO: 74.3 %
OSMOLALITY SERPL CALC.SUM OF ELEC: 288 MOSM/KG (ref 275–295)
PLATELET # BLD AUTO: 166 10(3)UL (ref 150–450)
POTASSIUM SERPL-SCNC: 4.4 MMOL/L (ref 3.5–5.1)
RBC # BLD AUTO: 4.1 X10(6)UL
SODIUM SERPL-SCNC: 139 MMOL/L (ref 136–145)
URATE SERPL-MCNC: 6.4 MG/DL
WBC # BLD AUTO: 9.1 X10(3) UL (ref 4–11)

## 2021-02-17 PROCEDURE — 99219 INITIAL OBSERVATION CARE,LEVL II: CPT | Performed by: OBSTETRICS & GYNECOLOGY

## 2021-02-17 RX ORDER — BETAMETHASONE SODIUM PHOSPHATE AND BETAMETHASONE ACETATE 3; 3 MG/ML; MG/ML
12 INJECTION, SUSPENSION INTRA-ARTICULAR; INTRALESIONAL; INTRAMUSCULAR; SOFT TISSUE EVERY 24 HOURS
Status: COMPLETED | OUTPATIENT
Start: 2021-02-17 | End: 2021-02-18

## 2021-02-17 RX ORDER — DEXTROSE MONOHYDRATE 25 G/50ML
50 INJECTION, SOLUTION INTRAVENOUS
Status: DISCONTINUED | OUTPATIENT
Start: 2021-02-17 | End: 2021-02-19

## 2021-02-17 RX ORDER — ACETAMINOPHEN 500 MG
1000 TABLET ORAL EVERY 6 HOURS PRN
Status: DISCONTINUED | OUTPATIENT
Start: 2021-02-17 | End: 2021-02-19

## 2021-02-17 RX ORDER — ALBUTEROL SULFATE 90 UG/1
2 AEROSOL, METERED RESPIRATORY (INHALATION) EVERY 6 HOURS PRN
Status: DISCONTINUED | OUTPATIENT
Start: 2021-02-17 | End: 2021-02-19

## 2021-02-17 NOTE — PROGRESS NOTES
Pt ambulatory to triage 4 with complaints of \"migraine headache\" since yesterday, pt took tylenol at 0900 isai doesn't know the dosage. Triage process explained and pt verbalized understanding.

## 2021-02-17 NOTE — PROGRESS NOTES
Pt informed that her dr wants to admit her overnight to watch her bp's, given steroids and consult with MFM. Pt verbalized understanding. pablom dc'd to allow pt to call home to make arrangement with her children.   Pt then taken ambulatory to antepartum room

## 2021-02-17 NOTE — PROGRESS NOTES
Spoke with Josh Armas. She would like to have Dr. Bienvenido Murray notified. Spoke with Dr. Bienvenido Murray. She recommends that patient be seen in ob triage today. Contacted patient. Advised that Dr. Bienvenido Murray would like her to go to ob triage for evaluation.  Patient states unders

## 2021-02-17 NOTE — NST
Nonstress Test   Patient: Maria Alejandra Zuluaga    Gestation: 33w4d    NST:       Variability: Moderate           Accelerations: Yes           Decelerations: None            Baseline: 135 BPM           Uterine Irritability: No           Contractions: Not p

## 2021-02-17 NOTE — PROGRESS NOTES
Received call back from patient. She was unable to get cuff. Assisted with scheduling BP check for tomorrow. She has been having headaches; tylenol doesn't seem to be helping much. Denies vision changes, RUQ pain, or changes in swelling.  Her swelling is in

## 2021-02-17 NOTE — CONSULTS
[facility]  Maternal-Fetal Medicine Inpatient Consultation    Date of Admission:  2/17/2021  Date of Consult:  2/17/2021    Reason for Consult:   A maternal-fetal medicine consultation was requested secondary to Preeclampsia.     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   33 weeks 136/90, today 148/88    Bps in triage 49 [facility] 2/15     Ultrasound Find most patients, and there is no cure except delivery. Nevertheless, the accurate identification of women at risk, early diagnosis, and prompt and appropriate management will lead to an improvement in maternal, and possibly , outcome. of cardiovascular disease later in life, including during the premenopausal period.    In contrast, preeclampsia/eclampsia occurring late in gestation in primigravid women and followed by a normotensive pregnancy does not appear to be associated with increa consultation and coordination of care. Our discussion is summarized above. The approximate physician face-to-face time was 30 minutes.       Dalila Willis MD  2/17/2021  4:30 PM

## 2021-02-18 ENCOUNTER — TELEPHONE (OUTPATIENT)
Dept: FAMILY MEDICINE CLINIC | Facility: CLINIC | Age: 26
End: 2021-02-18

## 2021-02-18 PROBLEM — O13.3 GESTATIONAL HYPERTENSION, THIRD TRIMESTER (HCC): Status: ACTIVE | Noted: 2021-02-16

## 2021-02-18 PROBLEM — O13.3 GESTATIONAL HYPERTENSION, THIRD TRIMESTER: Status: ACTIVE | Noted: 2021-02-16

## 2021-02-18 LAB
ALBUMIN SERPL-MCNC: 2.7 G/DL (ref 3.4–5)
ALBUMIN/GLOB SERPL: 0.9 {RATIO} (ref 1–2)
ALP LIVER SERPL-CCNC: 85 U/L
ALT SERPL-CCNC: 16 U/L
ANION GAP SERPL CALC-SCNC: 8 MMOL/L (ref 0–18)
AST SERPL-CCNC: 14 U/L (ref 15–37)
BASOPHILS # BLD AUTO: 0.01 X10(3) UL (ref 0–0.2)
BASOPHILS NFR BLD AUTO: 0.1 %
BILIRUB SERPL-MCNC: 0.3 MG/DL (ref 0.1–2)
BUN BLD-MCNC: 12 MG/DL (ref 7–18)
BUN/CREAT SERPL: 16.2 (ref 10–20)
CALCIUM BLD-MCNC: 9 MG/DL (ref 8.5–10.1)
CHLORIDE SERPL-SCNC: 107 MMOL/L (ref 98–112)
CO2 SERPL-SCNC: 23 MMOL/L (ref 21–32)
CREAT BLD-MCNC: 0.74 MG/DL
DEPRECATED RDW RBC AUTO: 38.5 FL (ref 35.1–46.3)
EOSINOPHIL # BLD AUTO: 0 X10(3) UL (ref 0–0.7)
EOSINOPHIL NFR BLD AUTO: 0 %
ERYTHROCYTE [DISTWIDTH] IN BLOOD BY AUTOMATED COUNT: 13 % (ref 11–15)
GLOBULIN PLAS-MCNC: 3.1 G/DL (ref 2.8–4.4)
GLUCOSE BLD-MCNC: 127 MG/DL (ref 70–99)
GLUCOSE BLD-MCNC: 132 MG/DL (ref 70–99)
GLUCOSE BLD-MCNC: 89 MG/DL (ref 70–99)
GLUCOSE BLD-MCNC: 90 MG/DL (ref 70–99)
HCT VFR BLD AUTO: 36.5 %
HGB BLD-MCNC: 12.3 G/DL
IMM GRANULOCYTES # BLD AUTO: 0.15 X10(3) UL (ref 0–1)
IMM GRANULOCYTES NFR BLD: 1.2 %
LYMPHOCYTES # BLD AUTO: 1.32 X10(3) UL (ref 1–4)
LYMPHOCYTES NFR BLD AUTO: 10.8 %
M PROTEIN 24H UR ELPH-MRATE: 1152.5 MG/24 HR (ref ?–149.1)
M PROTEIN MFR SERPL ELPH: 5.8 G/DL (ref 6.4–8.2)
MCH RBC QN AUTO: 28 PG (ref 26–34)
MCHC RBC AUTO-ENTMCNC: 33.7 G/DL (ref 31–37)
MCV RBC AUTO: 83.1 FL
MONOCYTES # BLD AUTO: 0.4 X10(3) UL (ref 0.1–1)
MONOCYTES NFR BLD AUTO: 3.3 %
NEUTROPHILS # BLD AUTO: 10.34 X10 (3) UL (ref 1.5–7.7)
NEUTROPHILS # BLD AUTO: 10.34 X10(3) UL (ref 1.5–7.7)
NEUTROPHILS NFR BLD AUTO: 84.6 %
OSMOLALITY SERPL CALC.SUM OF ELEC: 285 MOSM/KG (ref 275–295)
PLATELET # BLD AUTO: 208 10(3)UL (ref 150–450)
POTASSIUM SERPL-SCNC: 4.2 MMOL/L (ref 3.5–5.1)
RBC # BLD AUTO: 4.39 X10(6)UL
SODIUM SERPL-SCNC: 138 MMOL/L (ref 136–145)
SPECIMEN VOL UR: 2500 ML
URATE SERPL-MCNC: 6.7 MG/DL
WBC # BLD AUTO: 12.2 X10(3) UL (ref 4–11)

## 2021-02-18 PROCEDURE — 99224 SUBSEQUENT OBSERVATION CARE: CPT | Performed by: OBSTETRICS & GYNECOLOGY

## 2021-02-18 NOTE — CONSULTS
[facility]    Maternal Fetal Medicine Progress Note    Mian Wells Patient Status:  Observation    1995 MRN [id number]   [facility] 1SW-J Attending Forrest Bernabe MD   Hosp Day # 0 PCP Astrid Pierre MD     SUBJECTIVE:  Lucas Grissom develops severe features. · Delivery at 40 weeks if she continues to have preeclampsia without severe features.   · Continue daily fasting and 2-hour postprandial capillary blood glucose values  · Complete 24 urine  · Complete corticosteroid administration

## 2021-02-18 NOTE — TELEPHONE ENCOUNTER
Spouse calling in, wanting to know if we can send an order over for a bp cuff. Spouse stats they have the monitor but not the cuff. Or spouse is wanting to know where he can get one from if a RX can not be sent. Please advise.  Pt is currently at the [facility]

## 2021-02-18 NOTE — PLAN OF CARE
Problem: ANTEPARTUM/LABOR and DELIVERY  Goal: Maintain pregnancy as long as maternal and/or fetal condition is stable  Description: INTERVENTIONS:  - Maternal surveillance  - Fetal surveillance  - Monitor uterine activity  - Medications as ordered  - Bed effects  - Notify MD/LIP if interventions unsuccessful or patient reports new pain  - Anticipate increased pain with activity and pre-medicate as appropriate  Outcome: Progressing     Problem: ANXIETY  Goal: Will report anxiety at manageable levels  Descri

## 2021-02-18 NOTE — PROGRESS NOTES
33w5d  Pre eclampsia without severe features  Glucose acceptable  Good FM  BP improved  Betamethasone  CPM

## 2021-02-18 NOTE — PROGRESS NOTES
Received report from [place], [facility]. Assumed care of patient.

## 2021-02-18 NOTE — PROGRESS NOTES
24 hr urine collection explained and pt begun to collect as ordered. Pt eating dinner also,  at bedside. Plan of care explained and pt verbalized understanding.

## 2021-02-18 NOTE — PLAN OF CARE
Problem: ANTEPARTUM/LABOR and DELIVERY  Goal: Maintain pregnancy as long as maternal and/or fetal condition is stable  Description: INTERVENTIONS:  - Maternal surveillance  - Fetal surveillance  - Monitor uterine activity  - Medications as ordered  - Bed

## 2021-02-18 NOTE — H&P
[facility]  Obstetrics and Gynecology  History & Physical    Adam Garcia Patient Status:  Observation    1995 MRN [id number]   [facility] 1SW-J [facility] MD RAMA Henderson normal insertion is noted. EFW 1958 g (4 lb   5 oz) 26%. NICOLE is 17.2 cm. MVP is 4.8 cm.   BPP is 8/8    Obstetric History:   OB History    Para Term  AB Living   4 2 2 0 1 2   SAB TAB Ectopic Multiple Live Births   1 0 0 0 2      # Outcome 2/17/2021 at Unknown time    •  Albuterol Sulfate HFA (PROAIR HFA) 108 (90 Base) MCG/ACT Inhalation Aero Soln, INHALE 1 TO 2 PUFFS BY MOUTH EVERY 4 TO 6 HOURS AS NEEDED, Disp: 8.5 g, Rfl: 5, 2/16/2021 at Unknown time    •  fluticasone-salmeterol (ADVAIR DI Delivery: 4/3/21 who is being admitted for elevated blood pressure and newly diagnosed pre eclampsia without severe features     Patient Active Problem List:     Moderate persistent asthma without complication     Late prenatal care affecting pregnancy, an this time was spent in face to face discussion with the patient. The patient had many questions and concerns that were addressed.        Gertrude Severance, MD   [facility] - OBGYN

## 2021-02-19 ENCOUNTER — TELEPHONE (OUTPATIENT)
Dept: OBGYN CLINIC | Facility: CLINIC | Age: 26
End: 2021-02-19

## 2021-02-19 VITALS
HEIGHT: 65 IN | RESPIRATION RATE: 18 BRPM | SYSTOLIC BLOOD PRESSURE: 144 MMHG | HEART RATE: 71 BPM | WEIGHT: 293 LBS | DIASTOLIC BLOOD PRESSURE: 75 MMHG | TEMPERATURE: 98 F | BODY MASS INDEX: 48.82 KG/M2

## 2021-02-19 DIAGNOSIS — O14.90 PRE-ECLAMPSIA, ANTEPARTUM: Primary | ICD-10-CM

## 2021-02-19 LAB
ALBUMIN SERPL-MCNC: 2.7 G/DL (ref 3.4–5)
ALBUMIN/GLOB SERPL: 0.8 {RATIO} (ref 1–2)
ALP LIVER SERPL-CCNC: 84 U/L
ALT SERPL-CCNC: 16 U/L
ANION GAP SERPL CALC-SCNC: 8 MMOL/L (ref 0–18)
AST SERPL-CCNC: 19 U/L (ref 15–37)
BASOPHILS # BLD AUTO: 0.03 X10(3) UL (ref 0–0.2)
BASOPHILS NFR BLD AUTO: 0.2 %
BILIRUB SERPL-MCNC: 0.3 MG/DL (ref 0.1–2)
BUN BLD-MCNC: 13 MG/DL (ref 7–18)
BUN/CREAT SERPL: 20 (ref 10–20)
CALCIUM BLD-MCNC: 8.6 MG/DL (ref 8.5–10.1)
CHLORIDE SERPL-SCNC: 110 MMOL/L (ref 98–112)
CO2 SERPL-SCNC: 21 MMOL/L (ref 21–32)
CREAT BLD-MCNC: 0.65 MG/DL
DEPRECATED RDW RBC AUTO: 39 FL (ref 35.1–46.3)
EOSINOPHIL # BLD AUTO: 0 X10(3) UL (ref 0–0.7)
EOSINOPHIL NFR BLD AUTO: 0 %
ERYTHROCYTE [DISTWIDTH] IN BLOOD BY AUTOMATED COUNT: 13.2 % (ref 11–15)
GLOBULIN PLAS-MCNC: 3.3 G/DL (ref 2.8–4.4)
GLUCOSE BLD-MCNC: 100 MG/DL (ref 70–99)
GLUCOSE BLD-MCNC: 134 MG/DL (ref 70–99)
GLUCOSE BLD-MCNC: 83 MG/DL (ref 70–99)
GLUCOSE BLD-MCNC: 93 MG/DL (ref 70–99)
HCT VFR BLD AUTO: 35.6 %
HGB BLD-MCNC: 12.2 G/DL
IMM GRANULOCYTES # BLD AUTO: 0.21 X10(3) UL (ref 0–1)
IMM GRANULOCYTES NFR BLD: 1.7 %
LYMPHOCYTES # BLD AUTO: 1.37 X10(3) UL (ref 1–4)
LYMPHOCYTES NFR BLD AUTO: 11 %
M PROTEIN MFR SERPL ELPH: 6 G/DL (ref 6.4–8.2)
MCH RBC QN AUTO: 28.2 PG (ref 26–34)
MCHC RBC AUTO-ENTMCNC: 34.3 G/DL (ref 31–37)
MCV RBC AUTO: 82.4 FL
MONOCYTES # BLD AUTO: 0.36 X10(3) UL (ref 0.1–1)
MONOCYTES NFR BLD AUTO: 2.9 %
NEUTROPHILS # BLD AUTO: 10.5 X10 (3) UL (ref 1.5–7.7)
NEUTROPHILS # BLD AUTO: 10.5 X10(3) UL (ref 1.5–7.7)
NEUTROPHILS NFR BLD AUTO: 84.2 %
OSMOLALITY SERPL CALC.SUM OF ELEC: 288 MOSM/KG (ref 275–295)
PLATELET # BLD AUTO: 193 10(3)UL (ref 150–450)
POTASSIUM SERPL-SCNC: 4.4 MMOL/L (ref 3.5–5.1)
RBC # BLD AUTO: 4.32 X10(6)UL
SODIUM SERPL-SCNC: 139 MMOL/L (ref 136–145)
URATE SERPL-MCNC: 7.2 MG/DL
WBC # BLD AUTO: 12.5 X10(3) UL (ref 4–11)

## 2021-02-19 PROCEDURE — 99225 SUBSEQUENT OBSERVATION CARE: CPT | Performed by: OBSTETRICS & GYNECOLOGY

## 2021-02-19 PROCEDURE — 59025 FETAL NON-STRESS TEST: CPT | Performed by: OBSTETRICS & GYNECOLOGY

## 2021-02-19 NOTE — TELEPHONE ENCOUNTER
----- Message from Marisa Lombard, MD sent at 2/19/2021  9:39 AM CST -----  Induction scheduled 3/16/21 7:30 am.  Coming in night before for ripening

## 2021-02-19 NOTE — CONSULTS
[facility]    Maternal Fetal Medicine Progress Note    Adam Garcia Patient Status:  Observation    1995 MRN [id number]   [facility] 1SW-J Attending Nancy Matt MD   Hosp Day # 0 PCP Eulalia Santos MD     SUBJECTIVE:  Broderick Arvizu medications for her blood sugar control  · She is a candidate for outpatient management of mild preeclampsia.   I advised that she make an appointment with [facility] on Tuesday, February 23 for BPP and Dopplers and to keep her appointment as scheduled for Friday,

## 2021-02-19 NOTE — PROGRESS NOTES
[facility]: ANTEPARTUM PROGRESS NOTE     Subjective:   Patient without complaints. Good fetal movement, no bleeding or leaking fluid, no contractions. No headaches, visual changes or epigastric pain.      Objective:      02/19/21  0645   BP: 146/80 Late prenatal care affecting pregnancy, antepartum     Increased BMI     Pregestational diabetes mellitus, modified White class B     Rubella non-immune status, antepartum     Abnormal quad screen     Supervision of high-risk pregnancy of young multigravid

## 2021-02-19 NOTE — PROGRESS NOTES
FETAL NONSTRESS TEST:  Baseline FHR: 120 per minute  Fetal heart variability: moderate  Fetal Heart Rate accelerations: 15x15   Fetal Heart Rate decelerations: none  Tracing category 1  Uterine contractions: none  Fetal Non-stress Test Interpretation: reac

## 2021-02-22 DIAGNOSIS — Z34.90 PREGNANCY: Primary | ICD-10-CM

## 2021-02-23 ENCOUNTER — OFFICE VISIT (OUTPATIENT)
Dept: PERINATAL CARE | Facility: HOSPITAL | Age: 26
End: 2021-02-23
Attending: OBSTETRICS & GYNECOLOGY
Payer: COMMERCIAL

## 2021-02-23 VITALS
DIASTOLIC BLOOD PRESSURE: 90 MMHG | BODY MASS INDEX: 48.82 KG/M2 | WEIGHT: 293 LBS | HEART RATE: 94 BPM | SYSTOLIC BLOOD PRESSURE: 145 MMHG | HEIGHT: 65 IN

## 2021-02-23 DIAGNOSIS — O14.90 PRE-ECLAMPSIA, ANTEPARTUM: ICD-10-CM

## 2021-02-23 DIAGNOSIS — R63.8 INCREASED BMI: ICD-10-CM

## 2021-02-23 DIAGNOSIS — O24.319 PREGESTATIONAL DIABETES MELLITUS, MODIFIED WHITE CLASS B: ICD-10-CM

## 2021-02-23 DIAGNOSIS — E66.01 MORBID OBESITY WITH BODY MASS INDEX OF 50.0-59.9 IN ADULT (HCC): ICD-10-CM

## 2021-02-23 DIAGNOSIS — O14.90 PRE-ECLAMPSIA, ANTEPARTUM: Primary | ICD-10-CM

## 2021-02-23 DIAGNOSIS — O09.629 SUPERVISION OF HIGH-RISK PREGNANCY OF YOUNG MULTIGRAVIDA: ICD-10-CM

## 2021-02-23 PROCEDURE — 99214 OFFICE O/P EST MOD 30 MIN: CPT | Performed by: OBSTETRICS & GYNECOLOGY

## 2021-02-23 PROCEDURE — 76819 FETAL BIOPHYS PROFIL W/O NST: CPT | Performed by: OBSTETRICS & GYNECOLOGY

## 2021-02-23 NOTE — PROGRESS NOTES
Outpatient Maternal-Fetal Medicine Follow-Up     Dear Ja Brink     Thank you for requesting ultrasound evaluation and maternal fetal medicine consultation on your patient Michaelkeith See you are aware she is a 22year old female  with a compliance is adequate. Her capillary blood glucose records were reviewed today; her compliance with the recommended four times daily assessments (fasting and two-hour post-prandial) is adequate.    She has been non-compliant in sending in glucose logs to assessments (fasting and 2 hour postprandial)  · Weekly Maternal-Fetal Medicine review of capillary blood glucose values  · Continue insulin  · Follow-up growth ultrasound every 4 weeks in the third trimester  · Twice-weekly  testing (weekly BPP w

## 2021-02-25 NOTE — PROGRESS NOTES
Outpatient Maternal-Fetal Medicine Follow-Up     Dear Maria Esther Marin     Thank you for requesting ultrasound evaluation and maternal fetal medicine consultation on your patient Kaia Reid you are aware she is a 22year old female  with a PACS/Imaging Tab For Complete Ultrasound Report        DISCUSSION  During her visit we discussed and reviewed the following issues:  OBESITY  This patient has obesity; her pre-gravid BMI is: 45  See prior MFM notes for a detailed review.     DIABETES Daisy Montano declined invasive testing  · Pre-gestational diabetes - poor compliance with sending glucose values to [facility]  · moderate persistent asthma  · Class III Obesity  · Mild preeclampsia  · Normal fetal growth & BPP     RECOMMENDATIONS:  · Continue care with Dr. ONEAL

## 2021-02-26 ENCOUNTER — ROUTINE PRENATAL (OUTPATIENT)
Dept: OBGYN CLINIC | Facility: CLINIC | Age: 26
End: 2021-02-26

## 2021-02-26 VITALS — DIASTOLIC BLOOD PRESSURE: 80 MMHG | SYSTOLIC BLOOD PRESSURE: 126 MMHG | WEIGHT: 290 LBS | BODY MASS INDEX: 48 KG/M2

## 2021-02-26 DIAGNOSIS — O24.319 PREGESTATIONAL DIABETES MELLITUS, MODIFIED WHITE CLASS B: Primary | ICD-10-CM

## 2021-02-26 DIAGNOSIS — O13.3 GESTATIONAL HYPERTENSION, THIRD TRIMESTER: ICD-10-CM

## 2021-02-26 DIAGNOSIS — O09.629 SUPERVISION OF HIGH-RISK PREGNANCY OF YOUNG MULTIGRAVIDA: ICD-10-CM

## 2021-02-26 LAB
GLUCOSE (URINE DIPSTICK): NEGATIVE MG/DL
MULTISTIX LOT#: 8027 NUMERIC
PROTEIN (URINE DIPSTICK): 300 MG/DL

## 2021-02-26 PROCEDURE — 59025 FETAL NON-STRESS TEST: CPT | Performed by: OBSTETRICS & GYNECOLOGY

## 2021-02-26 PROCEDURE — 3074F SYST BP LT 130 MM HG: CPT | Performed by: OBSTETRICS & GYNECOLOGY

## 2021-02-26 PROCEDURE — 81002 URINALYSIS NONAUTO W/O SCOPE: CPT | Performed by: OBSTETRICS & GYNECOLOGY

## 2021-02-26 PROCEDURE — 3079F DIAST BP 80-89 MM HG: CPT | Performed by: OBSTETRICS & GYNECOLOGY

## 2021-02-28 NOTE — PROGRESS NOTES
DESHAWN - 35w0d    Doing well, +FM  Denies LOF/VB/uctx. No headache, change in vision or right upper quadrant pain  /80   Wt 290 lb (131.5 kg)   LMP  (LMP Unknown)   BMI 48.26 kg/m²    Twice-weekly testing.   Had BPP earlier this week with was 8 out of 8

## 2021-03-01 ENCOUNTER — MOBILE ENCOUNTER (OUTPATIENT)
Dept: OBGYN CLINIC | Facility: CLINIC | Age: 26
End: 2021-03-01

## 2021-03-01 ENCOUNTER — HOSPITAL ENCOUNTER (OUTPATIENT)
Facility: HOSPITAL | Age: 26
Setting detail: OBSERVATION
Discharge: HOME | End: 2021-03-01
Attending: OBSTETRICS & GYNECOLOGY | Admitting: OBSTETRICS & GYNECOLOGY
Payer: COMMERCIAL

## 2021-03-01 PROCEDURE — 59025 FETAL NON-STRESS TEST: CPT | Performed by: OBSTETRICS & GYNECOLOGY

## 2021-03-01 PROCEDURE — 99218 INITIAL OBSERVATION CARE,LEVL I: CPT | Performed by: OBSTETRICS & GYNECOLOGY

## 2021-03-02 ENCOUNTER — ULTRASOUND ENCOUNTER (OUTPATIENT)
Dept: PERINATAL CARE | Facility: HOSPITAL | Age: 26
End: 2021-03-02
Attending: OBSTETRICS & GYNECOLOGY
Payer: COMMERCIAL

## 2021-03-02 VITALS
DIASTOLIC BLOOD PRESSURE: 77 MMHG | WEIGHT: 290 LBS | HEIGHT: 65 IN | BODY MASS INDEX: 48.32 KG/M2 | SYSTOLIC BLOOD PRESSURE: 137 MMHG | HEART RATE: 89 BPM

## 2021-03-02 DIAGNOSIS — O24.319 PREGESTATIONAL DIABETES MELLITUS, MODIFIED WHITE CLASS B: Primary | ICD-10-CM

## 2021-03-02 DIAGNOSIS — O14.90 PRE-ECLAMPSIA, ANTEPARTUM: ICD-10-CM

## 2021-03-02 DIAGNOSIS — O24.319 PREGESTATIONAL DIABETES MELLITUS, MODIFIED WHITE CLASS B: ICD-10-CM

## 2021-03-02 DIAGNOSIS — O09.629 SUPERVISION OF HIGH-RISK PREGNANCY OF YOUNG MULTIGRAVIDA: ICD-10-CM

## 2021-03-02 DIAGNOSIS — E66.01 MORBID OBESITY WITH BODY MASS INDEX OF 50.0-59.9 IN ADULT (HCC): ICD-10-CM

## 2021-03-02 PROCEDURE — 76816 OB US FOLLOW-UP PER FETUS: CPT | Performed by: OBSTETRICS & GYNECOLOGY

## 2021-03-02 PROCEDURE — 99215 OFFICE O/P EST HI 40 MIN: CPT | Performed by: OBSTETRICS & GYNECOLOGY

## 2021-03-02 PROCEDURE — 76820 UMBILICAL ARTERY ECHO: CPT | Performed by: OBSTETRICS & GYNECOLOGY

## 2021-03-02 PROCEDURE — 76819 FETAL BIOPHYS PROFIL W/O NST: CPT | Performed by: OBSTETRICS & GYNECOLOGY

## 2021-03-02 NOTE — NST
Nonstress Test   Patient: Gloria Magallanes    Gestation: 35w2d    NST:       Variability: Moderate           Accelerations: No           Decelerations: None            Baseline: 130 BPM           Uterine Irritability: Yes           Contractions: Irreg

## 2021-03-02 NOTE — PROGRESS NOTES
Discharge instructions reviewed with pt; pt states verbal understanding; pt to keep scheduled appointment with MFM on 3/2/21 and keep scheduled appointment with Dr Corey Hastings on 3/5/21; pt discharge to home ambulatory with spouse at her side in stable conditio

## 2021-03-02 NOTE — PROGRESS NOTES
Pt is a  21 y/o ; ; edc 4/3/21 = 35.2 weeks gest; pt presents with c/o pressure pressure type feel to the upper right abdominal area; pt reports hte pressure as intermittent since yesterday morning; pt is admitted to triage 3  For [facility] labs and r/o pret

## 2021-03-02 NOTE — PROGRESS NOTES
[facility] TRIAGE NOTE     Subjective:   22year old year old female  at 35w2d presents complaining of contractions 10 in 2.5 hours. No leaking or bleeding. Also mentions mild RUQ abdominal discomfort.   Was supposed to go for preeclamptic pan Billing Type: Third-Party Bill

## 2021-03-02 NOTE — PROGRESS NOTES
Patient with 10 contractions in the past 2 hours with cervical pressure. No leaking or bleeding. Good fetal movement. Also has had pain in the right upper abdomen below her rib cage since yesterday.   Recommend to labor and delivery to rule out  l

## 2021-03-02 NOTE — PROGRESS NOTES
Dr Leila Bhagat viewed lab results; poc discussed; pt to be discharged to home; pt to follow up with scheduled MFM appointment tomorrow 3/2

## 2021-03-03 NOTE — PROGRESS NOTES
Outpatient Maternal-Fetal Medicine Follow-Up     Dear Parrish Ledbetter     Thank you for requesting ultrasound evaluation and maternal fetal medicine consultation on your patient Iveth Eckert you are aware she is a 22year old female  with a MELLITUS - follow-up  ADA diet recommendations were reviewed and the patient's dietary compliance is adequate.   Her capillary blood glucose records were reviewed today; her compliance with the recommended four times daily assessments (fasting and two-hour Doppler     RECOMMENDATIONS:  · Continue care with Dr. Beltran   · Continue four times daily capillary blood glucose assessments (fasting and 2 hour postprandial)  · Weekly Maternal-Fetal Medicine review of capillary blood glucose values  · Continue insulin

## 2021-03-05 ENCOUNTER — ULTRASOUND ENCOUNTER (OUTPATIENT)
Dept: PERINATAL CARE | Facility: HOSPITAL | Age: 26
End: 2021-03-05
Attending: OBSTETRICS & GYNECOLOGY
Payer: COMMERCIAL

## 2021-03-05 ENCOUNTER — ROUTINE PRENATAL (OUTPATIENT)
Dept: OBGYN CLINIC | Facility: CLINIC | Age: 26
End: 2021-03-05

## 2021-03-05 ENCOUNTER — APPOINTMENT (OUTPATIENT)
Dept: OBGYN CLINIC | Facility: CLINIC | Age: 26
End: 2021-03-05

## 2021-03-05 VITALS — WEIGHT: 293 LBS | DIASTOLIC BLOOD PRESSURE: 80 MMHG | SYSTOLIC BLOOD PRESSURE: 132 MMHG | BODY MASS INDEX: 49 KG/M2

## 2021-03-05 DIAGNOSIS — O13.3 GESTATIONAL HYPERTENSION, THIRD TRIMESTER: Primary | ICD-10-CM

## 2021-03-05 DIAGNOSIS — E66.01 MORBID OBESITY WITH BODY MASS INDEX OF 50.0-59.9 IN ADULT (HCC): ICD-10-CM

## 2021-03-05 DIAGNOSIS — O09.30 LATE PRENATAL CARE AFFECTING PREGNANCY, ANTEPARTUM: ICD-10-CM

## 2021-03-05 DIAGNOSIS — O28.8 NON-STRESS TEST NONREACTIVE: ICD-10-CM

## 2021-03-05 DIAGNOSIS — Z34.80 SUPERVISION OF OTHER NORMAL PREGNANCY: Primary | ICD-10-CM

## 2021-03-05 DIAGNOSIS — O09.629 SUPERVISION OF HIGH-RISK PREGNANCY OF YOUNG MULTIGRAVIDA: ICD-10-CM

## 2021-03-05 DIAGNOSIS — O24.319 PREGESTATIONAL DIABETES MELLITUS, MODIFIED WHITE CLASS B: ICD-10-CM

## 2021-03-05 DIAGNOSIS — O13.3 GESTATIONAL HYPERTENSION, THIRD TRIMESTER: ICD-10-CM

## 2021-03-05 DIAGNOSIS — O14.90 PRE-ECLAMPSIA, ANTEPARTUM: ICD-10-CM

## 2021-03-05 DIAGNOSIS — Z34.90 PREGNANCY: ICD-10-CM

## 2021-03-05 LAB
GLUCOSE (URINE DIPSTICK): NEGATIVE MG/DL
MULTISTIX LOT#: 8027 NUMERIC
PROTEIN (URINE DIPSTICK): 2000 MG/DL

## 2021-03-05 PROCEDURE — 3075F SYST BP GE 130 - 139MM HG: CPT | Performed by: NURSE PRACTITIONER

## 2021-03-05 PROCEDURE — 81002 URINALYSIS NONAUTO W/O SCOPE: CPT | Performed by: NURSE PRACTITIONER

## 2021-03-05 PROCEDURE — 76819 FETAL BIOPHYS PROFIL W/O NST: CPT | Performed by: OBSTETRICS & GYNECOLOGY

## 2021-03-05 PROCEDURE — 3079F DIAST BP 80-89 MM HG: CPT | Performed by: NURSE PRACTITIONER

## 2021-03-05 PROCEDURE — 59025 FETAL NON-STRESS TEST: CPT | Performed by: NURSE PRACTITIONER

## 2021-03-05 NOTE — PROGRESS NOTES
Santi Roblero    Dear Dr. Brianna Meneses    Thank you for requesting a standard ultrasound on your patient Han Pal.   As you are aware she is a 22year old female  with a huang pregnancy and an Estimated Date o

## 2021-03-05 NOTE — PROGRESS NOTES
DESHAWN  Doing well, +FM  Denies VB/LOF/uctx  Mode of delivery: IOL scheduled   Labor precautions discussed  SVE deferred  NST non- reactive- she is to go to [facility] for BPP  RTC 1 week for NST, she has a BPP early in the week with [facility]

## 2021-03-09 ENCOUNTER — ULTRASOUND ENCOUNTER (OUTPATIENT)
Dept: PERINATAL CARE | Facility: HOSPITAL | Age: 26
End: 2021-03-09
Attending: OBSTETRICS & GYNECOLOGY
Payer: COMMERCIAL

## 2021-03-09 VITALS
HEART RATE: 109 BPM | WEIGHT: 293 LBS | BODY MASS INDEX: 49 KG/M2 | SYSTOLIC BLOOD PRESSURE: 133 MMHG | DIASTOLIC BLOOD PRESSURE: 97 MMHG

## 2021-03-09 DIAGNOSIS — O09.629 SUPERVISION OF HIGH-RISK PREGNANCY OF YOUNG MULTIGRAVIDA: ICD-10-CM

## 2021-03-09 DIAGNOSIS — O14.90 PRE-ECLAMPSIA, ANTEPARTUM: ICD-10-CM

## 2021-03-09 DIAGNOSIS — O24.319 PREGESTATIONAL DIABETES MELLITUS, MODIFIED WHITE CLASS B: Primary | ICD-10-CM

## 2021-03-09 DIAGNOSIS — O24.319 PREGESTATIONAL DIABETES MELLITUS, MODIFIED WHITE CLASS B: ICD-10-CM

## 2021-03-09 DIAGNOSIS — E66.01 MORBID OBESITY WITH BODY MASS INDEX OF 50.0-59.9 IN ADULT (HCC): ICD-10-CM

## 2021-03-09 PROCEDURE — 76819 FETAL BIOPHYS PROFIL W/O NST: CPT | Performed by: OBSTETRICS & GYNECOLOGY

## 2021-03-09 PROCEDURE — 76820 UMBILICAL ARTERY ECHO: CPT | Performed by: OBSTETRICS & GYNECOLOGY

## 2021-03-09 PROCEDURE — 76820 UMBILICAL ARTERY ECHO: CPT

## 2021-03-09 PROCEDURE — 99214 OFFICE O/P EST MOD 30 MIN: CPT | Performed by: OBSTETRICS & GYNECOLOGY

## 2021-03-12 ENCOUNTER — TELEPHONE (OUTPATIENT)
Dept: OBGYN UNIT | Facility: HOSPITAL | Age: 26
End: 2021-03-12

## 2021-03-12 ENCOUNTER — ROUTINE PRENATAL (OUTPATIENT)
Dept: OBGYN CLINIC | Facility: CLINIC | Age: 26
End: 2021-03-12

## 2021-03-12 ENCOUNTER — APPOINTMENT (OUTPATIENT)
Dept: OBGYN CLINIC | Facility: CLINIC | Age: 26
End: 2021-03-12

## 2021-03-12 VITALS — SYSTOLIC BLOOD PRESSURE: 134 MMHG | DIASTOLIC BLOOD PRESSURE: 88 MMHG | BODY MASS INDEX: 50 KG/M2 | WEIGHT: 293 LBS

## 2021-03-12 DIAGNOSIS — O24.319 PREGESTATIONAL DIABETES MELLITUS, MODIFIED WHITE CLASS B: ICD-10-CM

## 2021-03-12 DIAGNOSIS — J45.40 MODERATE PERSISTENT ASTHMA WITHOUT COMPLICATION: ICD-10-CM

## 2021-03-12 DIAGNOSIS — O14.90 PRE-ECLAMPSIA, ANTEPARTUM: ICD-10-CM

## 2021-03-12 DIAGNOSIS — Z34.80 SUPERVISION OF OTHER NORMAL PREGNANCY: Primary | ICD-10-CM

## 2021-03-12 DIAGNOSIS — R63.8 INCREASED BMI: ICD-10-CM

## 2021-03-12 LAB
GLUCOSE (URINE DIPSTICK): 100 MG/DL
MULTISTIX LOT#: 8027 NUMERIC

## 2021-03-12 PROCEDURE — 59025 FETAL NON-STRESS TEST: CPT | Performed by: OBSTETRICS & GYNECOLOGY

## 2021-03-12 PROCEDURE — 81002 URINALYSIS NONAUTO W/O SCOPE: CPT | Performed by: OBSTETRICS & GYNECOLOGY

## 2021-03-12 PROCEDURE — 3079F DIAST BP 80-89 MM HG: CPT | Performed by: OBSTETRICS & GYNECOLOGY

## 2021-03-12 PROCEDURE — 3075F SYST BP GE 130 - 139MM HG: CPT | Performed by: OBSTETRICS & GYNECOLOGY

## 2021-03-13 NOTE — PROGRESS NOTES
DESHAWN 36w6d    Doing well. Feeling great, +FM  No contractions  No LOF, VB  Reports sugars have been controlled and BP at home normal. She denies headaches, vision changes    1. FHT-NST reactive  2. PNL:  GBS+ (was collected while inpatient  3.  Mode of deliv

## 2021-03-15 ENCOUNTER — HOSPITAL ENCOUNTER (INPATIENT)
Facility: HOSPITAL | Age: 26
LOS: 3 days | Discharge: HOME | End: 2021-03-18
Attending: OBSTETRICS & GYNECOLOGY | Admitting: OBSTETRICS & GYNECOLOGY
Payer: COMMERCIAL

## 2021-03-15 ENCOUNTER — APPOINTMENT (OUTPATIENT)
Dept: OBGYN CLINIC | Facility: HOSPITAL | Age: 26
End: 2021-03-15
Payer: COMMERCIAL

## 2021-03-15 LAB
ALBUMIN SERPL-MCNC: 2.7 G/DL (ref 3.4–5)
ALBUMIN/GLOB SERPL: 0.9 {RATIO} (ref 1–2)
ALP LIVER SERPL-CCNC: 108 U/L
ALT SERPL-CCNC: 21 U/L
ANION GAP SERPL CALC-SCNC: 8 MMOL/L (ref 0–18)
ANTIBODY SCREEN: NEGATIVE
AST SERPL-CCNC: 18 U/L (ref 15–37)
BASOPHILS # BLD AUTO: 0.03 X10(3) UL (ref 0–0.2)
BASOPHILS NFR BLD AUTO: 0.3 %
BILIRUB SERPL-MCNC: 0.2 MG/DL (ref 0.1–2)
BUN BLD-MCNC: 15 MG/DL (ref 7–18)
BUN/CREAT SERPL: 16.7 (ref 10–20)
CALCIUM BLD-MCNC: 8.9 MG/DL (ref 8.5–10.1)
CHLORIDE SERPL-SCNC: 107 MMOL/L (ref 98–112)
CO2 SERPL-SCNC: 23 MMOL/L (ref 21–32)
CREAT BLD-MCNC: 0.9 MG/DL
DEPRECATED RDW RBC AUTO: 37.8 FL (ref 35.1–46.3)
EOSINOPHIL # BLD AUTO: 0.19 X10(3) UL (ref 0–0.7)
EOSINOPHIL NFR BLD AUTO: 1.8 %
ERYTHROCYTE [DISTWIDTH] IN BLOOD BY AUTOMATED COUNT: 13.2 % (ref 11–15)
GLOBULIN PLAS-MCNC: 2.9 G/DL (ref 2.8–4.4)
GLUCOSE BLD-MCNC: 96 MG/DL (ref 70–99)
GLUCOSE BLD-MCNC: 98 MG/DL (ref 70–99)
HCT VFR BLD AUTO: 34.5 %
HGB BLD-MCNC: 12.1 G/DL
IMM GRANULOCYTES # BLD AUTO: 0.05 X10(3) UL (ref 0–1)
IMM GRANULOCYTES NFR BLD: 0.5 %
LYMPHOCYTES # BLD AUTO: 2.45 X10(3) UL (ref 1–4)
LYMPHOCYTES NFR BLD AUTO: 23.2 %
M PROTEIN MFR SERPL ELPH: 5.6 G/DL (ref 6.4–8.2)
MCH RBC QN AUTO: 28.3 PG (ref 26–34)
MCHC RBC AUTO-ENTMCNC: 35.1 G/DL (ref 31–37)
MCV RBC AUTO: 80.6 FL
MONOCYTES # BLD AUTO: 0.62 X10(3) UL (ref 0.1–1)
MONOCYTES NFR BLD AUTO: 5.9 %
NEUTROPHILS # BLD AUTO: 7.23 X10 (3) UL (ref 1.5–7.7)
NEUTROPHILS # BLD AUTO: 7.23 X10(3) UL (ref 1.5–7.7)
NEUTROPHILS NFR BLD AUTO: 68.3 %
OSMOLALITY SERPL CALC.SUM OF ELEC: 287 MOSM/KG (ref 275–295)
PLATELET # BLD AUTO: 166 10(3)UL (ref 150–450)
POTASSIUM SERPL-SCNC: 4.1 MMOL/L (ref 3.5–5.1)
RBC # BLD AUTO: 4.28 X10(6)UL
RH BLOOD TYPE: POSITIVE
SARS-COV-2 RNA RESP QL NAA+PROBE: NOT DETECTED
SODIUM SERPL-SCNC: 138 MMOL/L (ref 136–145)
T PALLIDUM AB SER QL IA: NONREACTIVE
URATE SERPL-MCNC: 7.7 MG/DL
WBC # BLD AUTO: 10.6 X10(3) UL (ref 4–11)

## 2021-03-15 PROCEDURE — 3E0P7VZ INTRODUCTION OF HORMONE INTO FEMALE REPRODUCTIVE, VIA NATURAL OR ARTIFICIAL OPENING: ICD-10-PCS | Performed by: OBSTETRICS & GYNECOLOGY

## 2021-03-15 RX ORDER — TERBUTALINE SULFATE 1 MG/ML
0.25 INJECTION, SOLUTION SUBCUTANEOUS AS NEEDED
Status: DISCONTINUED | OUTPATIENT
Start: 2021-03-15 | End: 2021-03-16 | Stop reason: HOSPADM

## 2021-03-15 RX ORDER — AMMONIA INHALANTS 0.04 G/.3ML
0.3 INHALANT RESPIRATORY (INHALATION) AS NEEDED
Status: DISCONTINUED | OUTPATIENT
Start: 2021-03-15 | End: 2021-03-16 | Stop reason: HOSPADM

## 2021-03-15 RX ORDER — IBUPROFEN 600 MG/1
600 TABLET ORAL EVERY 6 HOURS PRN
Status: DISCONTINUED | OUTPATIENT
Start: 2021-03-15 | End: 2021-03-16 | Stop reason: HOSPADM

## 2021-03-15 RX ORDER — TRISODIUM CITRATE DIHYDRATE AND CITRIC ACID MONOHYDRATE 500; 334 MG/5ML; MG/5ML
30 SOLUTION ORAL AS NEEDED
Status: DISCONTINUED | OUTPATIENT
Start: 2021-03-15 | End: 2021-03-16 | Stop reason: HOSPADM

## 2021-03-15 RX ORDER — DEXTROSE, SODIUM CHLORIDE, SODIUM LACTATE, POTASSIUM CHLORIDE, AND CALCIUM CHLORIDE 5; .6; .31; .03; .02 G/100ML; G/100ML; G/100ML; G/100ML; G/100ML
INJECTION, SOLUTION INTRAVENOUS AS NEEDED
Status: DISCONTINUED | OUTPATIENT
Start: 2021-03-15 | End: 2021-03-16 | Stop reason: HOSPADM

## 2021-03-15 RX ORDER — DEXTROSE MONOHYDRATE 25 G/50ML
50 INJECTION, SOLUTION INTRAVENOUS
Status: DISCONTINUED | OUTPATIENT
Start: 2021-03-15 | End: 2021-03-16 | Stop reason: HOSPADM

## 2021-03-15 RX ORDER — SODIUM CHLORIDE, SODIUM LACTATE, POTASSIUM CHLORIDE, CALCIUM CHLORIDE 600; 310; 30; 20 MG/100ML; MG/100ML; MG/100ML; MG/100ML
INJECTION, SOLUTION INTRAVENOUS CONTINUOUS
Status: DISCONTINUED | OUTPATIENT
Start: 2021-03-15 | End: 2021-03-16 | Stop reason: HOSPADM

## 2021-03-15 RX ORDER — CLINDAMYCIN PHOSPHATE 900 MG/50ML
900 INJECTION INTRAVENOUS EVERY 8 HOURS
Status: DISCONTINUED | OUTPATIENT
Start: 2021-03-15 | End: 2021-03-16 | Stop reason: HOSPADM

## 2021-03-15 RX ORDER — ACETAMINOPHEN 500 MG
500 TABLET ORAL EVERY 6 HOURS PRN
Status: DISCONTINUED | OUTPATIENT
Start: 2021-03-15 | End: 2021-03-16 | Stop reason: HOSPADM

## 2021-03-15 RX ORDER — ONDANSETRON 2 MG/ML
4 INJECTION INTRAMUSCULAR; INTRAVENOUS EVERY 6 HOURS PRN
Status: DISCONTINUED | OUTPATIENT
Start: 2021-03-15 | End: 2021-03-16 | Stop reason: HOSPADM

## 2021-03-15 RX ORDER — ALBUTEROL SULFATE 90 UG/1
2 AEROSOL, METERED RESPIRATORY (INHALATION) EVERY 4 HOURS PRN
Status: DISCONTINUED | OUTPATIENT
Start: 2021-03-15 | End: 2021-03-17

## 2021-03-16 ENCOUNTER — ANESTHESIA EVENT (OUTPATIENT)
Dept: OBGYN UNIT | Facility: HOSPITAL | Age: 26
End: 2021-03-16
Payer: COMMERCIAL

## 2021-03-16 ENCOUNTER — ANESTHESIA (OUTPATIENT)
Dept: OBGYN UNIT | Facility: HOSPITAL | Age: 26
End: 2021-03-16
Payer: COMMERCIAL

## 2021-03-16 LAB
APTT PPP: 28.5 SECONDS (ref 25.4–36.1)
BASOPHILS # BLD AUTO: 0.02 X10(3) UL (ref 0–0.2)
BASOPHILS # BLD AUTO: 0.04 X10(3) UL (ref 0–0.2)
BASOPHILS NFR BLD AUTO: 0.2 %
BASOPHILS NFR BLD AUTO: 0.3 %
DEPRECATED RDW RBC AUTO: 39.8 FL (ref 35.1–46.3)
DEPRECATED RDW RBC AUTO: 40.8 FL (ref 35.1–46.3)
EOSINOPHIL # BLD AUTO: 0.01 X10(3) UL (ref 0–0.7)
EOSINOPHIL # BLD AUTO: 0.04 X10(3) UL (ref 0–0.7)
EOSINOPHIL NFR BLD AUTO: 0.1 %
EOSINOPHIL NFR BLD AUTO: 0.3 %
ERYTHROCYTE [DISTWIDTH] IN BLOOD BY AUTOMATED COUNT: 13.3 % (ref 11–15)
ERYTHROCYTE [DISTWIDTH] IN BLOOD BY AUTOMATED COUNT: 13.3 % (ref 11–15)
FIBRINOGEN: 323 MG/DL (ref 200–446)
GLUCOSE BLD-MCNC: 70 MG/DL (ref 70–99)
GLUCOSE BLD-MCNC: 77 MG/DL (ref 70–99)
GLUCOSE BLD-MCNC: 78 MG/DL (ref 70–99)
GLUCOSE BLD-MCNC: 78 MG/DL (ref 70–99)
GLUCOSE BLD-MCNC: 80 MG/DL (ref 70–99)
HCT VFR BLD AUTO: 25.3 %
HCT VFR BLD AUTO: 28.9 %
HGB BLD-MCNC: 8.6 G/DL
HGB BLD-MCNC: 9.7 G/DL
IMM GRANULOCYTES # BLD AUTO: 0.06 X10(3) UL (ref 0–1)
IMM GRANULOCYTES # BLD AUTO: 0.06 X10(3) UL (ref 0–1)
IMM GRANULOCYTES NFR BLD: 0.4 %
IMM GRANULOCYTES NFR BLD: 0.5 %
INR BLD: 0.98 (ref 0.89–1.11)
LYMPHOCYTES # BLD AUTO: 1.28 X10(3) UL (ref 1–4)
LYMPHOCYTES # BLD AUTO: 1.76 X10(3) UL (ref 1–4)
LYMPHOCYTES NFR BLD AUTO: 10.4 %
LYMPHOCYTES NFR BLD AUTO: 12.5 %
MCH RBC QN AUTO: 28.1 PG (ref 26–34)
MCH RBC QN AUTO: 28.3 PG (ref 26–34)
MCHC RBC AUTO-ENTMCNC: 33.6 G/DL (ref 31–37)
MCHC RBC AUTO-ENTMCNC: 34 G/DL (ref 31–37)
MCV RBC AUTO: 82.7 FL
MCV RBC AUTO: 84.3 FL
MONOCYTES # BLD AUTO: 0.51 X10(3) UL (ref 0.1–1)
MONOCYTES # BLD AUTO: 0.85 X10(3) UL (ref 0.1–1)
MONOCYTES NFR BLD AUTO: 4.1 %
MONOCYTES NFR BLD AUTO: 6 %
NEUTROPHILS # BLD AUTO: 10.48 X10 (3) UL (ref 1.5–7.7)
NEUTROPHILS # BLD AUTO: 10.48 X10(3) UL (ref 1.5–7.7)
NEUTROPHILS # BLD AUTO: 11.32 X10 (3) UL (ref 1.5–7.7)
NEUTROPHILS # BLD AUTO: 11.32 X10(3) UL (ref 1.5–7.7)
NEUTROPHILS NFR BLD AUTO: 80.5 %
NEUTROPHILS NFR BLD AUTO: 84.7 %
PLATELET # BLD AUTO: 127 10(3)UL (ref 150–450)
PLATELET # BLD AUTO: 138 10(3)UL (ref 150–450)
PSA SERPL DL<=0.01 NG/ML-MCNC: 13.3 SECONDS (ref 12.4–14.6)
RBC # BLD AUTO: 3.06 X10(6)UL
RBC # BLD AUTO: 3.43 X10(6)UL
WBC # BLD AUTO: 12.4 X10(3) UL (ref 4–11)
WBC # BLD AUTO: 14.1 X10(3) UL (ref 4–11)

## 2021-03-16 PROCEDURE — 0UCG7ZZ EXTIRPATION OF MATTER FROM VAGINA, VIA NATURAL OR ARTIFICIAL OPENING: ICD-10-PCS | Performed by: OBSTETRICS & GYNECOLOGY

## 2021-03-16 PROCEDURE — 0UQMXZZ REPAIR VULVA, EXTERNAL APPROACH: ICD-10-PCS | Performed by: OBSTETRICS & GYNECOLOGY

## 2021-03-16 PROCEDURE — 10907ZC DRAINAGE OF AMNIOTIC FLUID, THERAPEUTIC FROM PRODUCTS OF CONCEPTION, VIA NATURAL OR ARTIFICIAL OPENING: ICD-10-PCS | Performed by: OBSTETRICS & GYNECOLOGY

## 2021-03-16 PROCEDURE — 0UC97ZZ EXTIRPATION OF MATTER FROM UTERUS, VIA NATURAL OR ARTIFICIAL OPENING: ICD-10-PCS | Performed by: OBSTETRICS & GYNECOLOGY

## 2021-03-16 PROCEDURE — 59400 OBSTETRICAL CARE: CPT | Performed by: OBSTETRICS & GYNECOLOGY

## 2021-03-16 PROCEDURE — 59899 UNLISTED PX MAT CARE&DLVR: CPT | Performed by: OBSTETRICS & GYNECOLOGY

## 2021-03-16 PROCEDURE — 59160 D & C AFTER DELIVERY: CPT | Performed by: OBSTETRICS & GYNECOLOGY

## 2021-03-16 PROCEDURE — 0W3R7ZZ CONTROL BLEEDING IN GENITOURINARY TRACT, VIA NATURAL OR ARTIFICIAL OPENING: ICD-10-PCS | Performed by: OBSTETRICS & GYNECOLOGY

## 2021-03-16 PROCEDURE — 0UDB7ZZ EXTRACTION OF ENDOMETRIUM, VIA NATURAL OR ARTIFICIAL OPENING: ICD-10-PCS | Performed by: OBSTETRICS & GYNECOLOGY

## 2021-03-16 RX ORDER — DOCUSATE SODIUM 100 MG/1
100 CAPSULE, LIQUID FILLED ORAL
Status: DISCONTINUED | OUTPATIENT
Start: 2021-03-16 | End: 2021-03-17

## 2021-03-16 RX ORDER — DIPHENHYDRAMINE HYDROCHLORIDE 50 MG/ML
25 INJECTION INTRAMUSCULAR; INTRAVENOUS ONCE AS NEEDED
Status: DISCONTINUED | OUTPATIENT
Start: 2021-03-16 | End: 2021-03-16

## 2021-03-16 RX ORDER — NEOSTIGMINE METHYLSULFATE 1 MG/ML
INJECTION INTRAVENOUS AS NEEDED
Status: DISCONTINUED | OUTPATIENT
Start: 2021-03-16 | End: 2021-03-16 | Stop reason: SURG

## 2021-03-16 RX ORDER — LIDOCAINE HYDROCHLORIDE 10 MG/ML
INJECTION, SOLUTION EPIDURAL; INFILTRATION; INTRACAUDAL; PERINEURAL AS NEEDED
Status: DISCONTINUED | OUTPATIENT
Start: 2021-03-16 | End: 2021-03-16 | Stop reason: SURG

## 2021-03-16 RX ORDER — NALBUPHINE HCL 10 MG/ML
2.5 AMPUL (ML) INJECTION
Status: DISCONTINUED | OUTPATIENT
Start: 2021-03-16 | End: 2021-03-18

## 2021-03-16 RX ORDER — SIMETHICONE 80 MG
80 TABLET,CHEWABLE ORAL 3 TIMES DAILY PRN
Status: DISCONTINUED | OUTPATIENT
Start: 2021-03-16 | End: 2021-03-17

## 2021-03-16 RX ORDER — BISACODYL 10 MG
10 SUPPOSITORY, RECTAL RECTAL ONCE AS NEEDED
Status: DISCONTINUED | OUTPATIENT
Start: 2021-03-16 | End: 2021-03-17

## 2021-03-16 RX ORDER — ZOLPIDEM TARTRATE 5 MG/1
5 TABLET ORAL NIGHTLY PRN
Status: DISCONTINUED | OUTPATIENT
Start: 2021-03-16 | End: 2021-03-17

## 2021-03-16 RX ORDER — HYDROMORPHONE HYDROCHLORIDE 1 MG/ML
INJECTION, SOLUTION INTRAMUSCULAR; INTRAVENOUS; SUBCUTANEOUS
Status: COMPLETED
Start: 2021-03-16 | End: 2021-03-16

## 2021-03-16 RX ORDER — ONDANSETRON 2 MG/ML
4 INJECTION INTRAMUSCULAR; INTRAVENOUS ONCE AS NEEDED
Status: DISCONTINUED | OUTPATIENT
Start: 2021-03-16 | End: 2021-03-16

## 2021-03-16 RX ORDER — CHOLECALCIFEROL (VITAMIN D3) 25 MCG
1 TABLET,CHEWABLE ORAL DAILY
Status: DISCONTINUED | OUTPATIENT
Start: 2021-03-16 | End: 2021-03-17

## 2021-03-16 RX ORDER — MISOPROSTOL 200 UG/1
TABLET ORAL
Status: COMPLETED
Start: 2021-03-16 | End: 2021-03-16

## 2021-03-16 RX ORDER — CEFAZOLIN SODIUM/WATER 2 G/20 ML
2 SYRINGE (ML) INTRAVENOUS EVERY 8 HOURS
Status: COMPLETED | OUTPATIENT
Start: 2021-03-16 | End: 2021-03-17

## 2021-03-16 RX ORDER — GLYCOPYRROLATE 0.2 MG/ML
INJECTION, SOLUTION INTRAMUSCULAR; INTRAVENOUS AS NEEDED
Status: DISCONTINUED | OUTPATIENT
Start: 2021-03-16 | End: 2021-03-16 | Stop reason: SURG

## 2021-03-16 RX ORDER — ACETAMINOPHEN 325 MG/1
650 TABLET ORAL EVERY 6 HOURS PRN
Status: DISCONTINUED | OUTPATIENT
Start: 2021-03-16 | End: 2021-03-17

## 2021-03-16 RX ORDER — BUPIVACAINE HCL/0.9 % NACL/PF 0.25 %
5 PLASTIC BAG, INJECTION (ML) EPIDURAL AS NEEDED
Status: DISCONTINUED | OUTPATIENT
Start: 2021-03-16 | End: 2021-03-16

## 2021-03-16 RX ORDER — CEFAZOLIN SODIUM/WATER 2 G/20 ML
SYRINGE (ML) INTRAVENOUS
Status: DISPENSED
Start: 2021-03-16 | End: 2021-03-17

## 2021-03-16 RX ORDER — DIPHENHYDRAMINE HYDROCHLORIDE 50 MG/ML
25 INJECTION INTRAMUSCULAR; INTRAVENOUS EVERY 4 HOURS PRN
Status: DISCONTINUED | OUTPATIENT
Start: 2021-03-16 | End: 2021-03-16

## 2021-03-16 RX ORDER — MISOPROSTOL 200 UG/1
1000 TABLET ORAL ONCE
Status: COMPLETED | OUTPATIENT
Start: 2021-03-16 | End: 2021-03-16

## 2021-03-16 RX ORDER — HYDROMORPHONE HYDROCHLORIDE 1 MG/ML
1 INJECTION, SOLUTION INTRAMUSCULAR; INTRAVENOUS; SUBCUTANEOUS EVERY 2 HOUR PRN
Status: DISCONTINUED | OUTPATIENT
Start: 2021-03-16 | End: 2021-03-17

## 2021-03-16 RX ORDER — ONDANSETRON 2 MG/ML
8 INJECTION INTRAMUSCULAR; INTRAVENOUS ONCE AS NEEDED
Status: DISCONTINUED | OUTPATIENT
Start: 2021-03-16 | End: 2021-03-16

## 2021-03-16 RX ORDER — TRANEXAMIC ACID 10 MG/ML
1000 INJECTION, SOLUTION INTRAVENOUS ONCE
Status: COMPLETED | OUTPATIENT
Start: 2021-03-16 | End: 2021-03-16

## 2021-03-16 RX ORDER — CEFAZOLIN SODIUM/WATER 2 G/20 ML
2 SYRINGE (ML) INTRAVENOUS ONCE
Status: COMPLETED | OUTPATIENT
Start: 2021-03-16 | End: 2021-03-16

## 2021-03-16 RX ORDER — TRANEXAMIC ACID 10 MG/ML
INJECTION, SOLUTION INTRAVENOUS
Status: DISPENSED
Start: 2021-03-16 | End: 2021-03-17

## 2021-03-16 RX ORDER — IBUPROFEN 600 MG/1
600 TABLET ORAL EVERY 6 HOURS
Status: DISCONTINUED | OUTPATIENT
Start: 2021-03-16 | End: 2021-03-16

## 2021-03-16 RX ORDER — DEXTROSE MONOHYDRATE 25 G/50ML
50 INJECTION, SOLUTION INTRAVENOUS
Status: DISCONTINUED | OUTPATIENT
Start: 2021-03-16 | End: 2021-03-17

## 2021-03-16 RX ORDER — SODIUM CHLORIDE, SODIUM LACTATE, POTASSIUM CHLORIDE, CALCIUM CHLORIDE 600; 310; 30; 20 MG/100ML; MG/100ML; MG/100ML; MG/100ML
INJECTION, SOLUTION INTRAVENOUS CONTINUOUS
Status: DISCONTINUED | OUTPATIENT
Start: 2021-03-16 | End: 2021-03-17

## 2021-03-16 RX ORDER — KETOROLAC TROMETHAMINE 30 MG/ML
30 INJECTION, SOLUTION INTRAMUSCULAR; INTRAVENOUS ONCE AS NEEDED
Status: COMPLETED | OUTPATIENT
Start: 2021-03-16 | End: 2021-03-16

## 2021-03-16 RX ORDER — ROCURONIUM BROMIDE 10 MG/ML
INJECTION, SOLUTION INTRAVENOUS AS NEEDED
Status: DISCONTINUED | OUTPATIENT
Start: 2021-03-16 | End: 2021-03-16 | Stop reason: SURG

## 2021-03-16 RX ORDER — PHENYLEPHRINE HCL 10 MG/ML
VIAL (ML) INJECTION AS NEEDED
Status: DISCONTINUED | OUTPATIENT
Start: 2021-03-16 | End: 2021-03-16 | Stop reason: SURG

## 2021-03-16 RX ORDER — IBUPROFEN 600 MG/1
600 TABLET ORAL EVERY 6 HOURS
Status: DISCONTINUED | OUTPATIENT
Start: 2021-03-16 | End: 2021-03-17

## 2021-03-16 RX ORDER — HYDROMORPHONE HYDROCHLORIDE 1 MG/ML
0.4 INJECTION, SOLUTION INTRAMUSCULAR; INTRAVENOUS; SUBCUTANEOUS EVERY 5 MIN PRN
Status: DISCONTINUED | OUTPATIENT
Start: 2021-03-16 | End: 2021-03-16

## 2021-03-16 RX ORDER — NALBUPHINE HCL 10 MG/ML
2.5 AMPUL (ML) INJECTION
Status: DISCONTINUED | OUTPATIENT
Start: 2021-03-16 | End: 2021-03-17

## 2021-03-16 RX ADMIN — LIDOCAINE HYDROCHLORIDE 50 MG: 10 INJECTION, SOLUTION EPIDURAL; INFILTRATION; INTRACAUDAL; PERINEURAL at 13:46:00

## 2021-03-16 RX ADMIN — ROCURONIUM BROMIDE 20 MG: 10 INJECTION, SOLUTION INTRAVENOUS at 13:55:00

## 2021-03-16 RX ADMIN — CEFAZOLIN SODIUM/WATER 2 G: 2 G/20 ML SYRINGE (ML) INTRAVENOUS at 13:56:00

## 2021-03-16 RX ADMIN — GLYCOPYRROLATE 0.6 MG: 0.2 INJECTION, SOLUTION INTRAMUSCULAR; INTRAVENOUS at 14:19:00

## 2021-03-16 RX ADMIN — TRANEXAMIC ACID 1000 MG: 10 INJECTION, SOLUTION INTRAVENOUS at 13:55:00

## 2021-03-16 RX ADMIN — PHENYLEPHRINE HCL 100 MCG: 10 MG/ML VIAL (ML) INJECTION at 13:57:00

## 2021-03-16 RX ADMIN — PHENYLEPHRINE HCL 100 MCG: 10 MG/ML VIAL (ML) INJECTION at 13:51:00

## 2021-03-16 RX ADMIN — NEOSTIGMINE METHYLSULFATE 5 MG: 1 INJECTION INTRAVENOUS at 14:19:00

## 2021-03-16 NOTE — PROGRESS NOTES
This RN received pt and report from [address] [place] [address] at this time. Transported per postpartum bed from 2215 to OR3 for D&C per Dr. Nora Ware order. VSS on transport. Care assumed and discussed POC with pt and .  MB to assume care of

## 2021-03-16 NOTE — PROGRESS NOTES
Patient up to wheelchair  with assist x 1. Stable condition with steady ambulatory gait. Patient transferred to mother/baby room 2215 per wheelchair escorted by OBT in stable condition with baby and personal belongings.   Accompanied by significant other a

## 2021-03-16 NOTE — PROGRESS NOTES
NURSING ADMISSION NOTE      Patient admitted via Wheelchair  Oriented to room. Safety precautions initiated. Bed in low position. Call light in reach.   Received patient, AOx4, VSS, transferred to bed, postpartum care and  care instructions in

## 2021-03-16 NOTE — PLAN OF CARE
Problem: SAFETY ADULT - FALL  Goal: Free from fall injury  Description: INTERVENTIONS:  - Assess pt frequently for physical needs  - Identify cognitive and physical deficits and behaviors that affect risk of falls.   - [place] fall precautions as indica appropriate  3/16/2021 1006 by Sean Beavers RN  Outcome: Completed  3/16/2021 0746 by Sean Beavers RN  Outcome: Progressing     Problem: ANXIETY  Goal: Will report anxiety at manageable levels  Description: INTERVENTIONS:  - Roman Barcenas

## 2021-03-16 NOTE — ANESTHESIA PROCEDURE NOTES
Airway  Urgency: elective      General Information and Staff    Patient location during procedure: OR  Anesthesiologist: Leonarda Donis MD  Performed: anesthesiologist     Indications and Patient Condition  Indications for airway management: anesthesia  S

## 2021-03-16 NOTE — PROGRESS NOTES
Patient called to use bathroom, assisted, able to void, but slowly got dizzy and pale, had some clots , total of 300cc bleed, made it to bed, and more clots, called Dr. Kerry Bobo

## 2021-03-16 NOTE — PROGRESS NOTES
Pt moved per cart from 133 to 120 in stable condition by this RN and OBT. , all belongings, and  brought down to room per MB RN. Pt michael, MD updated and aware of status.  Stock and Bakri in place draining, kerlix packing and long tail lap in

## 2021-03-16 NOTE — L&D DELIVERY NOTE
Delilah Flores [[id number]]    Labor Events     labor?: No   steroids?: None  Cervical ripening date/time: 3/16/2021 2040  Cervical ripening type: Misoprostol  Antibiotics received during labor?: Yes  Antibiotics (enter # doses in comment irritability No response Grimace Cry or active withdrawal    Muscle tone Limp Some flexion Active motion    Respiratory effort Absent Weak cry; hypoventilation Good, crying              1 Minute:  5 Minute:  10 Minute:  15 Minute:  20 Minute:    Skin color umbilical cord was doubly clamped and transected. The placenta was then delivered rapidly spontaneously intact. The patient did have bloody amniotic fluid upon rupture of membranes and blood clots with delivery of the infant.   Inspection of the placenta

## 2021-03-16 NOTE — PROGRESS NOTES
LABOR PROGRESS NOTE    Subjective:   Patient without complaints.   Pain well controlled with epidural.    Objective:   [unfilled]  Temp (24hrs), Av.2 °F (36.8 °C), Min:98.2 °F (36.8 °C), Max:98.2 °F (36.8 °C)    FHT: Baseline 130, moderate variability, n

## 2021-03-16 NOTE — PLAN OF CARE
Problem: BIRTH - VAGINAL/ SECTION  Goal: Fetal and maternal status remain reassuring during the birth process  Description: INTERVENTIONS:  - Monitor vital signs  - Monitor fetal heart rate  - Monitor uterine activity  - Monitor labor progression Assess pt frequently for physical needs  - Identify cognitive and physical deficits and behaviors that affect risk of falls.   - [place] fall precautions as indicated by assessment.  - Educate pt/family on patient safety including physical limitations  -

## 2021-03-16 NOTE — ANESTHESIA PROCEDURE NOTES
Labor Analgesia  Performed by: Garett Munson MD  Authorized by: Garett Munson MD       General Information and Staff    Start Time:  3/16/2021 6:07 AM  End Time:  3/16/2021 6:16 AM  Anesthesiologist:  Garett Munson MD  Performed by:   Anesthesiologist

## 2021-03-16 NOTE — PROGRESS NOTES
CTSP for post partum bleeding. 400 ml on L&D after delivery, improved with cytotec 1000u, now with additional 300 ml.     Uterus firm, 2 above umbilicus  BME additional 100 ml blood  Patient unable to tolerate BME well  Recommend exam under anesthesia, pos

## 2021-03-16 NOTE — ANESTHESIA POSTPROCEDURE EVALUATION
Haim 2 Patient Status:  Inpatient   Age/Gender 22year old female MRN [id number]   Location [address] [facility] Attending Mary Fees, [address] [facility] Se Day # 1 PCP Miguel Duvall MD       Anesthesia Post-op Note    Pro

## 2021-03-16 NOTE — PROGRESS NOTES
Discharge instructions given to pt and discussed, pt verb understanding and agreeable to POC. Pt refused wheelchair off unit. Pt escorted off unit by RN with instructions in hand in stable condition.

## 2021-03-16 NOTE — OPERATIVE REPORT
[facility]   Section- Operative Report    Kenneid Duvall Patient Status:  Inpatient    1995 MRN [id number]   Location [address] [facility] Attending Erin Liu MD   Hosp Day # 1 PCP Darrell Duncan MD     Preoperative

## 2021-03-16 NOTE — PLAN OF CARE
Problem: SAFETY ADULT - FALL  Goal: Free from fall injury  Description: INTERVENTIONS:  - Assess pt frequently for physical needs  - Identify cognitive and physical deficits and behaviors that affect risk of falls.   - [place] fall precautions as indica previous experience with breast feeding.  - Provide information as needed about early infant feeding cues (e.g., rooting, lip smacking, sucking fingers/hand) versus late cue of crying.  - Discuss/demonstrate breast feeding aids (e.g., infant sling, nursing

## 2021-03-16 NOTE — H&P
[facility]  History & Physical    Carlotakirsten Magallanes Patient Status:  Inpatient    1995 MRN [id number]   Location [address] [facility] Attending Blanca Wiggins MD    0 PCP Hao Iglesias MD     CC: patient is here for IOL    SUB Smokeless tobacco: Never Used    Alcohol use: Not Currently      Alcohol/week: 0.0 standard drinks      Comment: occ      Home Meds: Insulin Aspart Pen (NOVOLOG FLEXPEN) 100 UNIT/ML Subcutaneous Solution Pen-injector, Inject 10 Units into the skin daily wi [id number] at 37w2d here for IOL    1. Labor: none  2. Fetal monitoring: EFM  3. GBS: pos  4. Pain: epidural    Risks, benefits, alternatives and possible complications have been discussed in detail with the patient.   Pre-admission, admission, and post admiss

## 2021-03-16 NOTE — PROGRESS NOTES
Pt left room 103 in stable condition and was not lightheaded or nauseous. This RN alerted that pt became lightheaded and nauseous on ride per wheelchair from 103 to [phone] with OBT. RN and OBT assisted pt with ammonia and cool wash rags.  When pt was bought t

## 2021-03-16 NOTE — ANESTHESIA PREPROCEDURE EVALUATION
PRE-OP EVALUATION    Patient Name: Gloria Magallanes    Pre-op Diagnosis: Labor Pain    Labor epidural    Pre-op vitals reviewed. Temp: 98.2 °F (36.8 °C)  Pulse: 84  Resp: 18  BP: 139/78  SpO2: 99 %  Body mass index is 49.99 kg/m².     Current medicati Continuous  insulin detemir (LEVEMIR) 100 UNIT/ML flextouch 12 Units, 12 Units, Subcutaneous, Nightly  Albuterol Sulfate  (90 Base) MCG/ACT inhaler 2 puff, 2 puff, Inhalation, Q4H PRN  Fluticasone Furoate-Vilanterol (BREO ELLIPTA) 200-25 MCG/INH inh Endo/Other      (+) diabetes  gestational, using insulin                         Pulmonary      (+) asthma                     Neuro/Psych    Negative neuro/psych ROS.                                 Past Surgical History:   Procedure Laterality Date   • MI

## 2021-03-16 NOTE — PROGRESS NOTES
Pt  [facility] 4/3/21 presents to L&D for scheduled IOL. Pt denies regular UCs, leaking of fluid or vag bleeding, pt states + fetal movement. Pt labels verified per pt and this RN, consents explained and signed.  POC discussed, pt veb understanding and agreeab

## 2021-03-16 NOTE — ANESTHESIA PREPROCEDURE EVALUATION
PRE-OP EVALUATION    Patient Name: Trice Morales    Pre-op Diagnosis: post partum hemorrhage    Procedure(s):    D&C, bakri balloon placement    Surgeon(s) and Role:     Asa Awan MD - Primary    Pre-op vitals reviewed.   Temp: 98 °F (36.7 °C acid (CYKLOKAPRON) 1000-0.7 MG/100ML-% IVPB premix, , ,   [COMPLETED] ceFAZolin sodium (ANCEF/KEFZOL) 2 GM/20ML premix IV syringe 2 g, 2 g, Intravenous, Once  ceFAZolin sodium (ANCEF/KEFZOL) 2 GM/20ML premix IV syringe, , ,   [] misoprostol (CYTOTEC Evaluation    Patient summary reviewed. Anesthetic Complications  (-) history of anesthetic complications         GI/Hepatic/Renal    Negative GI/hepatic/renal ROS.                              Cardiovascular                (+) obesity  (+) hypertension accepts and wishes to proceed. The consent was signed without further questions.     Plan/risks discussed with: patient                Present on Admission:  • Pregnancy  • Pre-eclampsia, antepartum

## 2021-03-17 LAB
BASOPHILS # BLD AUTO: 0.03 X10(3) UL (ref 0–0.2)
BASOPHILS NFR BLD AUTO: 0.3 %
DEPRECATED RDW RBC AUTO: 41.8 FL (ref 35.1–46.3)
EOSINOPHIL # BLD AUTO: 0.13 X10(3) UL (ref 0–0.7)
EOSINOPHIL NFR BLD AUTO: 1.3 %
ERYTHROCYTE [DISTWIDTH] IN BLOOD BY AUTOMATED COUNT: 13.7 % (ref 11–15)
GLUCOSE BLD-MCNC: 146 MG/DL (ref 70–99)
GLUCOSE BLD-MCNC: 77 MG/DL (ref 70–99)
GLUCOSE BLD-MCNC: 89 MG/DL (ref 70–99)
HCT VFR BLD AUTO: 23.6 %
HGB BLD-MCNC: 7.9 G/DL
IMM GRANULOCYTES # BLD AUTO: 0.05 X10(3) UL (ref 0–1)
IMM GRANULOCYTES NFR BLD: 0.5 %
LYMPHOCYTES # BLD AUTO: 2.86 X10(3) UL (ref 1–4)
LYMPHOCYTES NFR BLD AUTO: 29.2 %
MCH RBC QN AUTO: 28.3 PG (ref 26–34)
MCHC RBC AUTO-ENTMCNC: 33.5 G/DL (ref 31–37)
MCV RBC AUTO: 84.6 FL
MONOCYTES # BLD AUTO: 0.54 X10(3) UL (ref 0.1–1)
MONOCYTES NFR BLD AUTO: 5.5 %
NEUTROPHILS # BLD AUTO: 6.2 X10 (3) UL (ref 1.5–7.7)
NEUTROPHILS # BLD AUTO: 6.2 X10(3) UL (ref 1.5–7.7)
NEUTROPHILS NFR BLD AUTO: 63.2 %
PLATELET # BLD AUTO: 124 10(3)UL (ref 150–450)
RBC # BLD AUTO: 2.79 X10(6)UL
WBC # BLD AUTO: 9.8 X10(3) UL (ref 4–11)

## 2021-03-17 RX ORDER — SIMETHICONE 80 MG
80 TABLET,CHEWABLE ORAL 3 TIMES DAILY PRN
Status: DISCONTINUED | OUTPATIENT
Start: 2021-03-17 | End: 2021-03-18

## 2021-03-17 RX ORDER — IBUPROFEN 600 MG/1
600 TABLET ORAL EVERY 6 HOURS
Status: DISCONTINUED | OUTPATIENT
Start: 2021-03-17 | End: 2021-03-18

## 2021-03-17 RX ORDER — BISACODYL 10 MG
10 SUPPOSITORY, RECTAL RECTAL ONCE AS NEEDED
Status: DISCONTINUED | OUTPATIENT
Start: 2021-03-17 | End: 2021-03-18

## 2021-03-17 RX ORDER — ACETAMINOPHEN 325 MG/1
650 TABLET ORAL EVERY 6 HOURS PRN
Status: DISCONTINUED | OUTPATIENT
Start: 2021-03-17 | End: 2021-03-18

## 2021-03-17 RX ORDER — DOCUSATE SODIUM 100 MG/1
100 CAPSULE, LIQUID FILLED ORAL
Status: DISCONTINUED | OUTPATIENT
Start: 2021-03-17 | End: 2021-03-18

## 2021-03-17 NOTE — PROGRESS NOTES
Bedside report to SAMINA Love RN at this time. POC discussed and enforced. Pt stable in bed with IV infusing, sousa draining, bakri draining, packing in place in vagina, and call light in reach. Pt verbalized understanding of POC.

## 2021-03-17 NOTE — PLAN OF CARE
Problem: SAFETY ADULT - FALL  Goal: Free from fall injury  Description: INTERVENTIONS:  - Assess pt frequently for physical needs  - Identify cognitive and physical deficits and behaviors that affect risk of falls.   - [place] fall precautions as indica previous experience with breast feeding.  - Provide information as needed about early infant feeding cues (e.g., rooting, lip smacking, sucking fingers/hand) versus late cue of crying.  - Discuss/demonstrate breast feeding aids (e.g., infant sling, nursing

## 2021-03-17 NOTE — PROGRESS NOTES
Patient up to bathroom with assist x 2. Voided 400 at this time. Patient transferred to mother/baby room 1109 per wheelchair in stable condition with baby and personal belongings. Accompanied by significant other and staff.   Report given to mother/baby R

## 2021-03-17 NOTE — PROGRESS NOTES
Patient arrived from L&D via wheelchair, in stable condition. Report received from L&D RN. Patient orientated to room. Call light within reach.

## 2021-03-17 NOTE — PROGRESS NOTES
Normal lochia. Uterus firm at umbilicus  Hg 7.0--> 8.6 over 4 hours. Coagulation panel unremarkable  Will do clear liquids overnight, advance diet in am if stable. Recheck CBC in am.  If bleeding stable, will remove vaginal packing and Bakri tomorrow.

## 2021-03-17 NOTE — PROGRESS NOTES
Labor Analgesia Follow Up Note    Patient underwent epidural anesthesia for labor analgesia,    Placenta Date/Time: 3/16/2021  9:16 AM    Delivery Date/Time[de-identified] 3/16/2021  9:13 AM    /77   Pulse 80   Temp 99.1 °F (37.3 °C) (Oral)   Resp 20   Ht 1.651 m

## 2021-03-17 NOTE — PROGRESS NOTES
[facility]  Post-Partum  Progress Note    Perlita Lower Patient Status:  Inpatient    1995 MRN [id number]   Location [address] [facility] Attending Za Arellano MD   Hosp Day # 2 PCP Snow Ngo MD     SUBJECTIVE:    Po metformin for now, as not yet on general diet.  AM accucheck this AM normal. She only took metformin outside of pregnancy  Once bakri out and bleeding stable, ok to advance diet and move to [facility]  All questions answered  Also briefly reviewed circumcision but

## 2021-03-18 ENCOUNTER — TELEPHONE (OUTPATIENT)
Dept: FAMILY MEDICINE CLINIC | Facility: CLINIC | Age: 26
End: 2021-03-18

## 2021-03-18 ENCOUNTER — TELEPHONE (OUTPATIENT)
Dept: OBGYN CLINIC | Facility: CLINIC | Age: 26
End: 2021-03-18

## 2021-03-18 VITALS
HEART RATE: 77 BPM | OXYGEN SATURATION: 98 % | TEMPERATURE: 99 F | RESPIRATION RATE: 18 BRPM | SYSTOLIC BLOOD PRESSURE: 135 MMHG | HEIGHT: 65 IN | BODY MASS INDEX: 48.82 KG/M2 | WEIGHT: 293 LBS | DIASTOLIC BLOOD PRESSURE: 67 MMHG

## 2021-03-18 LAB
BASOPHILS # BLD AUTO: 0.04 X10(3) UL (ref 0–0.2)
BASOPHILS NFR BLD AUTO: 0.5 %
DEPRECATED RDW RBC AUTO: 40.7 FL (ref 35.1–46.3)
EOSINOPHIL # BLD AUTO: 0.17 X10(3) UL (ref 0–0.7)
EOSINOPHIL NFR BLD AUTO: 2.1 %
ERYTHROCYTE [DISTWIDTH] IN BLOOD BY AUTOMATED COUNT: 13.8 % (ref 11–15)
GLUCOSE BLD-MCNC: 88 MG/DL (ref 70–99)
HCT VFR BLD AUTO: 22.9 %
HGB BLD-MCNC: 7.7 G/DL
IMM GRANULOCYTES # BLD AUTO: 0.11 X10(3) UL (ref 0–1)
IMM GRANULOCYTES NFR BLD: 1.4 %
LYMPHOCYTES # BLD AUTO: 2.24 X10(3) UL (ref 1–4)
LYMPHOCYTES NFR BLD AUTO: 27.6 %
MCH RBC QN AUTO: 27.9 PG (ref 26–34)
MCHC RBC AUTO-ENTMCNC: 33.6 G/DL (ref 31–37)
MCV RBC AUTO: 83 FL
MONOCYTES # BLD AUTO: 0.44 X10(3) UL (ref 0.1–1)
MONOCYTES NFR BLD AUTO: 5.4 %
NEUTROPHILS # BLD AUTO: 5.12 X10 (3) UL (ref 1.5–7.7)
NEUTROPHILS # BLD AUTO: 5.12 X10(3) UL (ref 1.5–7.7)
NEUTROPHILS NFR BLD AUTO: 63 %
PLATELET # BLD AUTO: 144 10(3)UL (ref 150–450)
RBC # BLD AUTO: 2.76 X10(6)UL
WBC # BLD AUTO: 8.1 X10(3) UL (ref 4–11)

## 2021-03-18 NOTE — TELEPHONE ENCOUNTER
Called and spoke to patient informed her she is due for her yearly physical. Patient does not want to schedule appt at this time. Patient stated she recently had a baby and wants to schedule an appt, line was transfer to the phone room.

## 2021-03-18 NOTE — TELEPHONE ENCOUNTER
Patient brought in [address] [address]. Pd $25.00. this is for her spouse and he needs paperwork asap. I did mention that it could take up to 2 weeks. Please fax when complete. See form.  Placed in nurse bin in [place] for completion

## 2021-03-18 NOTE — PLAN OF CARE
Problem: SAFETY ADULT - FALL  Goal: Free from fall injury  Description: INTERVENTIONS:  - Assess pt frequently for physical needs  - Identify cognitive and physical deficits and behaviors that affect risk of falls.   - Willard fall precautions as indica previous experience with breast feeding.  - Provide information as needed about early infant feeding cues (e.g., rooting, lip smacking, sucking fingers/hand) versus late cue of crying.  - Discuss/demonstrate breast feeding aids (e.g., infant sling, nursing

## 2021-03-18 NOTE — PROGRESS NOTES
VAGINAL DELIVERY POSTPARTUM DAY 2    Subjective:   Patient without complaints. Bleeding normal.  Ambulating without difficulty. Urinating without difficulty.     Objective:     Patient Vitals for the past 24 hrs:   BP Temp Temp src Pulse Resp SpO2   03/17

## 2021-03-19 LAB — BLOOD TYPE BARCODE: 6200

## 2021-03-20 ENCOUNTER — TELEPHONE (OUTPATIENT)
Dept: OBGYN UNIT | Facility: HOSPITAL | Age: 26
End: 2021-03-20

## 2021-03-20 NOTE — PROGRESS NOTES
Reviewed self and infant care w / mom, she verbalizes understanding of instructions reviewed. Encourage to follow up w/ MDs as directed and w/ questions/concerns.  Lochia light, and reviewed

## 2021-03-23 ENCOUNTER — POSTPARTUM (OUTPATIENT)
Dept: OBGYN CLINIC | Facility: CLINIC | Age: 26
End: 2021-03-23

## 2021-03-23 VITALS — DIASTOLIC BLOOD PRESSURE: 80 MMHG | WEIGHT: 274.19 LBS | BODY MASS INDEX: 46 KG/M2 | SYSTOLIC BLOOD PRESSURE: 112 MMHG

## 2021-03-23 DIAGNOSIS — Z87.59 HISTORY OF GESTATIONAL HYPERTENSION: Primary | ICD-10-CM

## 2021-03-23 PROCEDURE — 3074F SYST BP LT 130 MM HG: CPT | Performed by: NURSE PRACTITIONER

## 2021-03-23 PROCEDURE — 3079F DIAST BP 80-89 MM HG: CPT | Performed by: NURSE PRACTITIONER

## 2021-03-23 NOTE — PROGRESS NOTES
Gyne note     S: patient is a 22year old yo [id number] here for a blood pressure reading. She delivered last week and the baby is doing well.  She feels good, denies any abnormal pain, bleeding, vision changes, persistent headaches, chest pain, dyspnea, or el

## 2021-03-23 NOTE — DISCHARGE SUMMARY
[facility] [place] Patient Status:  inpatient    1995 MRN [id number]   Location 1109/1109-A Attending [facility] [address] PCP Dre Seay MD     VAGINAL DELIVERY DISCHARGE SUMMARY     Admit date: 3/15/2021    Discharge date: 3/22/2021     At Potential duplicate medications found. Please discuss with provider.       STOP taking these medications    Insulin Aspart Pen (NOVOLOG FLEXPEN) 100 UNIT/ML Subcutaneous Solution Pen-injector    insulin detemir (LEVEMIR FLEXTOUCH) 100 UNIT/ML Subcutaneous S

## 2021-03-24 ENCOUNTER — NURSE ONLY (OUTPATIENT)
Dept: LACTATION | Facility: HOSPITAL | Age: 26
End: 2021-03-24
Attending: OBSTETRICS & GYNECOLOGY
Payer: COMMERCIAL

## 2021-03-24 VITALS — TEMPERATURE: 98 F

## 2021-03-24 DIAGNOSIS — O92.4 HYPOGALACTIA WITH INFANT BREAST ATTACHMENT DIFFICULTY: ICD-10-CM

## 2021-03-24 PROCEDURE — 99213 OFFICE O/P EST LOW 20 MIN: CPT

## 2021-03-24 NOTE — PROGRESS NOTES
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

## 2021-03-31 ENCOUNTER — TELEPHONE (OUTPATIENT)
Dept: LACTATION | Facility: HOSPITAL | Age: 26
End: 2021-03-31

## 2021-03-31 ENCOUNTER — NURSE ONLY (OUTPATIENT)
Dept: LACTATION | Facility: HOSPITAL | Age: 26
End: 2021-03-31
Attending: OBSTETRICS & GYNECOLOGY
Payer: COMMERCIAL

## 2021-03-31 VITALS — TEMPERATURE: 98 F

## 2021-03-31 PROCEDURE — 99212 OFFICE O/P EST SF 10 MIN: CPT

## 2021-03-31 NOTE — TELEPHONE ENCOUNTER
This [facility] contacted Jenniffer Hodgson to clarify follow up appointment scheduled 4/8/21 @ 10:00 vs paperwork originally providing stating follow up 4/7/21 @ 10:00 in error. Jenniffer Hodgson verbalized understanding, [facility].

## 2021-03-31 NOTE — PROGRESS NOTES
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

## 2021-04-12 ENCOUNTER — IMMUNIZATION (OUTPATIENT)
Dept: LAB | Age: 26
End: 2021-04-12
Attending: HOSPITALIST
Payer: COMMERCIAL

## 2021-04-12 DIAGNOSIS — Z23 NEED FOR VACCINATION: Primary | ICD-10-CM

## 2021-04-12 PROCEDURE — 0001A SARSCOV2 VAC 30MCG/0.3ML IM: CPT

## 2021-04-19 PROBLEM — Z34.90 PREGNANCY (HCC): Status: RESOLVED | Noted: 2021-02-17 | Resolved: 2021-04-19

## 2021-04-19 PROBLEM — O09.629 SUPERVISION OF HIGH-RISK PREGNANCY OF YOUNG MULTIGRAVIDA: Status: RESOLVED | Noted: 2020-12-04 | Resolved: 2021-04-19

## 2021-04-19 PROBLEM — O28.0 ABNORMAL QUAD SCREEN: Status: RESOLVED | Noted: 2020-11-09 | Resolved: 2021-04-19

## 2021-04-19 PROBLEM — O09.629 SUPERVISION OF HIGH-RISK PREGNANCY OF YOUNG MULTIGRAVIDA (HCC): Status: RESOLVED | Noted: 2020-12-04 | Resolved: 2021-04-19

## 2021-04-19 PROBLEM — Z34.90 PREGNANCY: Status: RESOLVED | Noted: 2021-02-17 | Resolved: 2021-04-19

## 2021-04-20 ENCOUNTER — POSTPARTUM (OUTPATIENT)
Dept: OBGYN CLINIC | Facility: CLINIC | Age: 26
End: 2021-04-20

## 2021-04-20 VITALS — WEIGHT: 278.63 LBS | DIASTOLIC BLOOD PRESSURE: 72 MMHG | BODY MASS INDEX: 46 KG/M2 | SYSTOLIC BLOOD PRESSURE: 118 MMHG

## 2021-04-20 DIAGNOSIS — Z30.013 ENCOUNTER FOR INITIAL PRESCRIPTION OF INJECTABLE CONTRACEPTIVE: Primary | ICD-10-CM

## 2021-04-20 DIAGNOSIS — O99.019 ANTEPARTUM ANEMIA: ICD-10-CM

## 2021-04-20 PROCEDURE — 3074F SYST BP LT 130 MM HG: CPT | Performed by: OBSTETRICS & GYNECOLOGY

## 2021-04-20 PROCEDURE — 96372 THER/PROPH/DIAG INJ SC/IM: CPT | Performed by: OBSTETRICS & GYNECOLOGY

## 2021-04-20 PROCEDURE — 81025 URINE PREGNANCY TEST: CPT | Performed by: OBSTETRICS & GYNECOLOGY

## 2021-04-20 PROCEDURE — 3078F DIAST BP <80 MM HG: CPT | Performed by: OBSTETRICS & GYNECOLOGY

## 2021-04-20 RX ORDER — MEDROXYPROGESTERONE ACETATE 150 MG/ML
150 INJECTION, SUSPENSION INTRAMUSCULAR
Status: DISCONTINUED | OUTPATIENT
Start: 2021-04-20 | End: 2021-09-13

## 2021-04-20 RX ADMIN — MEDROXYPROGESTERONE ACETATE 150 MG: 150 INJECTION, SUSPENSION INTRAMUSCULAR at 11:02:00

## 2021-04-20 NOTE — PROGRESS NOTES
Subjective:  Patient presents with:  Postpartum Care    Enrico Emmanuel presents for her 6 week post partum exam after vaginal delivery. She denies any gastrointestinal/genitourinary complaints. She denies any symptoms of depression.   Infant doing

## 2021-04-27 ENCOUNTER — TELEPHONE (OUTPATIENT)
Dept: FAMILY MEDICINE CLINIC | Facility: CLINIC | Age: 26
End: 2021-04-27

## 2021-04-27 NOTE — PATIENT INSTRUCTIONS
Understanding Postpartum Depression  It’s common for new moms to feel tired and beaulieu after having a baby.  But if you have very strong feelings of sadness or anger, or have trouble doing your normal daily tasks, you may have a serious mood disorder jean marie also drop at the same time. · Depression. You may be at greater risk if you have a history of depression, or if you are currently being treated for depression. · Extreme tiredness (fatigue).  It can take a few weeks to physically recover from giving birth themselves  · May have thoughts of harming their baby  Are you at risk for postpartum depression?   You are more at risk for this condition if you have:  · A history of depression (including postpartum depression in a past pregnancy)  · A family member with meals, shopping, and laundry  To learn more  Find more information at:  · [employer] www. postpartum.net/  · Office on [address] www.womenshealth.gov/  Sumit last reviewed this educational content on 6/1/2020  © 0647-2317 The S

## 2021-05-03 ENCOUNTER — IMMUNIZATION (OUTPATIENT)
Dept: LAB | Age: 26
End: 2021-05-03
Attending: HOSPITALIST
Payer: COMMERCIAL

## 2021-05-03 DIAGNOSIS — Z23 NEED FOR VACCINATION: Primary | ICD-10-CM

## 2021-05-03 PROCEDURE — 0002A SARSCOV2 VAC 30MCG/0.3ML IM: CPT

## 2021-05-03 NOTE — PROGRESS NOTES
[facility] Family Medicine Office Note  Chief Complaint:   Patient presents with:  Depression: f/u      HPI:   This is a 32year old female coming in for  HPI  Depression   Feeling overwhelmed   Patient wishes to discuss her current sx.  States he INHALE 1 TO 2 PUFFS BY MOUTH EVERY 4 TO 6 HOURS AS NEEDED 8.5 g 5   • fluticasone-salmeterol (ADVAIR DISKUS) 250-50 MCG/DOSE Inhalation Aerosol Powder, Breath Activated Inhale 1 puff into the lungs every 12 (twelve) hours.  3 each 3      Counseling given: N Refills   • Sertraline HCl 50 MG Oral Tab 30 tablet 0     Sig: Take 1 tablet (50 mg total) by mouth daily. Start with 1/2 tab daily x1 week. Return in about 4 weeks (around 5/25/2021) for depression.

## 2021-05-19 NOTE — PROGRESS NOTES
[facility] Family Medicine Office Note  Chief Complaint:   Patient presents with:  Medication Follow-Up      HPI:   This is a 32year old female coming in for  HPI  Follow up - postpartum depression   Started on sertraline 4 weeks ago   Is notici MCG/DOSE Inhalation Aerosol Powder, Breath Activated Inhale 1 puff into the lungs every 12 (twelve) hours. 3 each 3      Counseling given: Not Answered       REVIEW OF SYSTEMS:   Review of Systems   Constitutional: Negative for chills and fever.    HENT: Ne Talk with [facility]     2. History of pre-eclampsia  - COMP METABOLIC PANEL (14); Future  - LIPID PANEL; Future  - TSH W REFLEX TO FREE T4; Future    3.  Laboratory examination ordered as part of a routine general medical examination  - CBC WITH DIFFERENTIAL WITH

## 2021-08-12 ENCOUNTER — PATIENT OUTREACH (OUTPATIENT)
Dept: FAMILY MEDICINE CLINIC | Facility: CLINIC | Age: 26
End: 2021-08-12

## 2021-08-23 ENCOUNTER — LAB ENCOUNTER (OUTPATIENT)
Dept: LAB | Age: 26
End: 2021-08-23
Attending: FAMILY MEDICINE
Payer: COMMERCIAL

## 2021-08-23 DIAGNOSIS — Z87.59 HISTORY OF PRE-ECLAMPSIA: ICD-10-CM

## 2021-08-23 DIAGNOSIS — O99.019 ANTEPARTUM ANEMIA: ICD-10-CM

## 2021-08-23 DIAGNOSIS — Z00.00 LABORATORY EXAMINATION ORDERED AS PART OF A ROUTINE GENERAL MEDICAL EXAMINATION: ICD-10-CM

## 2021-08-23 LAB
ALBUMIN SERPL-MCNC: 3.8 G/DL (ref 3.4–5)
ALBUMIN/GLOB SERPL: 1.2 {RATIO} (ref 1–2)
ALP LIVER SERPL-CCNC: 51 U/L
ALT SERPL-CCNC: 20 U/L
ANION GAP SERPL CALC-SCNC: 5 MMOL/L (ref 0–18)
AST SERPL-CCNC: 12 U/L (ref 15–37)
BASOPHILS # BLD AUTO: 0.07 X10(3) UL (ref 0–0.2)
BASOPHILS NFR BLD AUTO: 0.8 %
BILIRUB SERPL-MCNC: 0.4 MG/DL (ref 0.1–2)
BUN BLD-MCNC: 15 MG/DL (ref 7–18)
CALCIUM BLD-MCNC: 8.9 MG/DL (ref 8.5–10.1)
CHLORIDE SERPL-SCNC: 108 MMOL/L (ref 98–112)
CHOLEST SMN-MCNC: 138 MG/DL (ref ?–200)
CO2 SERPL-SCNC: 26 MMOL/L (ref 21–32)
CREAT BLD-MCNC: 0.85 MG/DL
EOSINOPHIL # BLD AUTO: 0.54 X10(3) UL (ref 0–0.7)
EOSINOPHIL NFR BLD AUTO: 5.9 %
ERYTHROCYTE [DISTWIDTH] IN BLOOD BY AUTOMATED COUNT: 18.8 %
GLOBULIN PLAS-MCNC: 3.3 G/DL (ref 2.8–4.4)
GLUCOSE BLD-MCNC: 106 MG/DL (ref 70–99)
HCT VFR BLD AUTO: 38.7 %
HDLC SERPL-MCNC: 46 MG/DL (ref 40–59)
HGB BLD-MCNC: 12 G/DL
IMM GRANULOCYTES # BLD AUTO: 0.03 X10(3) UL (ref 0–1)
IMM GRANULOCYTES NFR BLD: 0.3 %
LDLC SERPL CALC-MCNC: 75 MG/DL (ref ?–100)
LYMPHOCYTES # BLD AUTO: 2.16 X10(3) UL (ref 1–4)
LYMPHOCYTES NFR BLD AUTO: 23.4 %
M PROTEIN MFR SERPL ELPH: 7.1 G/DL (ref 6.4–8.2)
MCH RBC QN AUTO: 22.6 PG (ref 26–34)
MCHC RBC AUTO-ENTMCNC: 31 G/DL (ref 31–37)
MCV RBC AUTO: 72.7 FL
MONOCYTES # BLD AUTO: 0.48 X10(3) UL (ref 0.1–1)
MONOCYTES NFR BLD AUTO: 5.2 %
NEUTROPHILS # BLD AUTO: 5.95 X10 (3) UL (ref 1.5–7.7)
NEUTROPHILS # BLD AUTO: 5.95 X10(3) UL (ref 1.5–7.7)
NEUTROPHILS NFR BLD AUTO: 64.4 %
NONHDLC SERPL-MCNC: 92 MG/DL (ref ?–130)
OSMOLALITY SERPL CALC.SUM OF ELEC: 289 MOSM/KG (ref 275–295)
PATIENT FASTING Y/N/NP: YES
PATIENT FASTING Y/N/NP: YES
PLATELET # BLD AUTO: 317 10(3)UL (ref 150–450)
POTASSIUM SERPL-SCNC: 3.9 MMOL/L (ref 3.5–5.1)
RBC # BLD AUTO: 5.32 X10(6)UL
SODIUM SERPL-SCNC: 139 MMOL/L (ref 136–145)
TRIGL SERPL-MCNC: 87 MG/DL (ref 30–149)
TSI SER-ACNC: 0.83 MIU/ML (ref 0.36–3.74)
VLDLC SERPL CALC-MCNC: 13 MG/DL (ref 0–30)
WBC # BLD AUTO: 9.2 X10(3) UL (ref 4–11)

## 2021-08-23 PROCEDURE — 80061 LIPID PANEL: CPT

## 2021-08-23 PROCEDURE — 84443 ASSAY THYROID STIM HORMONE: CPT

## 2021-08-23 PROCEDURE — 36415 COLL VENOUS BLD VENIPUNCTURE: CPT

## 2021-08-23 PROCEDURE — 80053 COMPREHEN METABOLIC PANEL: CPT

## 2021-08-23 PROCEDURE — 85025 COMPLETE CBC W/AUTO DIFF WBC: CPT

## 2021-09-01 ENCOUNTER — TELEPHONE (OUTPATIENT)
Dept: OBGYN CLINIC | Facility: CLINIC | Age: 26
End: 2021-09-01

## 2021-09-01 ENCOUNTER — TELEPHONE (OUTPATIENT)
Dept: FAMILY MEDICINE CLINIC | Facility: CLINIC | Age: 26
End: 2021-09-01

## 2021-09-01 PROCEDURE — 87480 CANDIDA DNA DIR PROBE: CPT | Performed by: FAMILY MEDICINE

## 2021-09-01 PROCEDURE — 87491 CHLMYD TRACH DNA AMP PROBE: CPT | Performed by: FAMILY MEDICINE

## 2021-09-01 PROCEDURE — 87591 N.GONORRHOEAE DNA AMP PROB: CPT | Performed by: FAMILY MEDICINE

## 2021-09-01 PROCEDURE — 87510 GARDNER VAG DNA DIR PROBE: CPT | Performed by: FAMILY MEDICINE

## 2021-09-01 PROCEDURE — 87660 TRICHOMONAS VAGIN DIR PROBE: CPT | Performed by: FAMILY MEDICINE

## 2021-09-01 NOTE — TELEPHONE ENCOUNTER
Called and spoke to pt in regards to today's appt stated she can contact Dr. Abbi Jackson office and schedule the appt. Patient understands no questions at this time.

## 2021-09-01 NOTE — TELEPHONE ENCOUNTER
Dr. Jose Choi would like a call back on this patient @[phone].  Would like her seen today     Thank you

## 2021-09-01 NOTE — PATIENT INSTRUCTIONS
Start allergy pill - zyrtec, claritin, or allegra once daily     Restart advair - make sure to rinse your mouth out with water about 5 minutes after use    Increase sertraline to 100mg daily     Tips for Weight Loss    1.  Portion size - look at the size of

## 2021-09-01 NOTE — PROGRESS NOTES
HPI/Subjective:   Kennedi Duvall is a 32year old female who presents for Depression     Depression   States overall she feels better but still has some days she struggles   States she doubled water intake   Getting about 6h or more of sleep/night Positive for wheezing. Genitourinary: Positive for vaginal bleeding and vaginal discharge. Negative for flank pain, genital sores, hematuria, pelvic pain and vaginal pain. Psychiatric/Behavioral: Positive for dysphoric mood and sleep disturbance.    Al Dispense: 90 tablet; Refill: 1  -     HEMOGLOBIN A1C  4. Vaginal discharge  -     Chlamydia/Gc Amplification; Future; Expected date: 09/01/2021  -     Vaginitis/Vaginosis, Dna Probe;  Future; Expected date: 09/01/2021  -     Vaginitis/Vaginosis, Dna Probe

## 2021-09-01 NOTE — TELEPHONE ENCOUNTER
Called Dr. Danielle Booker. States patient was in her office today for exam; possible BV. She completed a culture, awaiting results. Pt had Depo on 4/20/21 at [facility] appt, but did not return for next injection.   Pt now desires Mirena IUD; discussed at [facility] appt with

## 2021-09-12 PROBLEM — O13.3 GESTATIONAL HYPERTENSION, THIRD TRIMESTER (HCC): Status: RESOLVED | Noted: 2021-02-16 | Resolved: 2021-09-12

## 2021-09-12 PROBLEM — O09.899 RUBELLA NON-IMMUNE STATUS, ANTEPARTUM (HCC): Status: RESOLVED | Noted: 2020-11-07 | Resolved: 2021-09-12

## 2021-09-12 PROBLEM — O09.30 LATE PRENATAL CARE AFFECTING PREGNANCY, ANTEPARTUM: Status: RESOLVED | Noted: 2020-11-05 | Resolved: 2021-09-12

## 2021-09-12 PROBLEM — O99.891 RUBELLA NON-IMMUNE STATUS, ANTEPARTUM: Status: RESOLVED | Noted: 2020-11-07 | Resolved: 2021-09-12

## 2021-09-12 PROBLEM — Z28.39 RUBELLA NON-IMMUNE STATUS, ANTEPARTUM: Status: RESOLVED | Noted: 2020-11-07 | Resolved: 2021-09-12

## 2021-09-12 PROBLEM — Z28.39 RUBELLA NON-IMMUNE STATUS, ANTEPARTUM (HCC): Status: RESOLVED | Noted: 2020-11-07 | Resolved: 2021-09-12

## 2021-09-12 PROBLEM — O09.899 RUBELLA NON-IMMUNE STATUS, ANTEPARTUM: Status: RESOLVED | Noted: 2020-11-07 | Resolved: 2021-09-12

## 2021-09-12 PROBLEM — O13.3 GESTATIONAL HYPERTENSION, THIRD TRIMESTER: Status: RESOLVED | Noted: 2021-02-16 | Resolved: 2021-09-12

## 2021-09-12 PROBLEM — Z28.3 RUBELLA NON-IMMUNE STATUS, ANTEPARTUM: Status: RESOLVED | Noted: 2020-11-07 | Resolved: 2021-09-12

## 2021-09-12 PROBLEM — O09.30 LATE PRENATAL CARE AFFECTING PREGNANCY, ANTEPARTUM (HCC): Status: RESOLVED | Noted: 2020-11-05 | Resolved: 2021-09-12

## 2021-09-13 ENCOUNTER — OFFICE VISIT (OUTPATIENT)
Dept: OBGYN CLINIC | Facility: CLINIC | Age: 26
End: 2021-09-13

## 2021-09-13 VITALS
DIASTOLIC BLOOD PRESSURE: 82 MMHG | HEIGHT: 65 IN | SYSTOLIC BLOOD PRESSURE: 132 MMHG | BODY MASS INDEX: 48.65 KG/M2 | WEIGHT: 292 LBS

## 2021-09-13 DIAGNOSIS — Z01.818 PRE-PROCEDURAL EXAMINATION: Primary | ICD-10-CM

## 2021-09-13 DIAGNOSIS — Z30.430 ENCOUNTER FOR INSERTION OF INTRAUTERINE CONTRACEPTIVE DEVICE: ICD-10-CM

## 2021-09-13 PROBLEM — Z97.5 IUD (INTRAUTERINE DEVICE) IN PLACE: Status: ACTIVE | Noted: 2021-09-13

## 2021-09-13 LAB — CONTROL LINE PRESENT WITH A CLEAR BACKGROUND (YES/NO): YES YES/NO

## 2021-09-13 PROCEDURE — 3075F SYST BP GE 130 - 139MM HG: CPT | Performed by: OBSTETRICS & GYNECOLOGY

## 2021-09-13 PROCEDURE — 58300 INSERT INTRAUTERINE DEVICE: CPT | Performed by: OBSTETRICS & GYNECOLOGY

## 2021-09-13 PROCEDURE — 3008F BODY MASS INDEX DOCD: CPT | Performed by: OBSTETRICS & GYNECOLOGY

## 2021-09-13 PROCEDURE — 81025 URINE PREGNANCY TEST: CPT | Performed by: OBSTETRICS & GYNECOLOGY

## 2021-09-13 PROCEDURE — 3079F DIAST BP 80-89 MM HG: CPT | Performed by: OBSTETRICS & GYNECOLOGY

## 2021-09-13 NOTE — PROCEDURES
IUD Insertion Procedure Note    Indications: Desires contraception    Procedure Details:   Urine pregnancy test negative. Patient with irregular menses since Depo injection. Has not had intercourse since delivery. Checklist reviewed prior to insertion.

## 2021-10-12 ENCOUNTER — OFFICE VISIT (OUTPATIENT)
Dept: OBGYN CLINIC | Facility: CLINIC | Age: 26
End: 2021-10-12

## 2021-10-12 VITALS
WEIGHT: 292 LBS | SYSTOLIC BLOOD PRESSURE: 130 MMHG | DIASTOLIC BLOOD PRESSURE: 86 MMHG | BODY MASS INDEX: 48.65 KG/M2 | HEIGHT: 65 IN

## 2021-10-12 DIAGNOSIS — Z30.431 IUD CHECK UP: Primary | ICD-10-CM

## 2021-10-12 PROCEDURE — 3075F SYST BP GE 130 - 139MM HG: CPT | Performed by: OBSTETRICS & GYNECOLOGY

## 2021-10-12 PROCEDURE — 3008F BODY MASS INDEX DOCD: CPT | Performed by: OBSTETRICS & GYNECOLOGY

## 2021-10-12 PROCEDURE — 99213 OFFICE O/P EST LOW 20 MIN: CPT | Performed by: OBSTETRICS & GYNECOLOGY

## 2021-10-12 PROCEDURE — 3079F DIAST BP 80-89 MM HG: CPT | Performed by: OBSTETRICS & GYNECOLOGY

## 2021-10-12 NOTE — PATIENT INSTRUCTIONS
[address] recognizes the need to provide comprehensive behavioral health services to our community.  That's why we established [facility] team of experienced psychiatrists, psychologists, phys

## 2021-10-12 NOTE — PROGRESS NOTES
Subjective:   Zakiya Lara is a 32year old female who presents for Depression (f/u) and Asthma (f/u)     Depression   Sertraline was increased - up to 100mg daily   States she feels better. easier to get out of bed daily. No SI/HI.  Family has urgency. Musculoskeletal: Negative for arthralgias and myalgias. Skin: Negative for pallor and rash. Neurological: Negative for dizziness and headaches.        Objective:   /70   Pulse 76   Temp 98 °F (36.7 °C)   Resp 16   Ht 5' 5\" (1.651 m)

## 2021-10-12 NOTE — PROGRESS NOTES
Subjective:  Perlita Sotomayor presents for follow up to IUD insertion. She denies any pain, fever or vaginal discharge. She has had persistent light bleeding since insertion.     Objective:  Physical Exam:   /86   Ht 65\"   Wt 292 lb (132.5 k

## 2021-11-29 ENCOUNTER — OFFICE VISIT (OUTPATIENT)
Dept: FAMILY MEDICINE CLINIC | Facility: CLINIC | Age: 26
End: 2021-11-29

## 2021-11-29 VITALS
SYSTOLIC BLOOD PRESSURE: 124 MMHG | DIASTOLIC BLOOD PRESSURE: 80 MMHG | OXYGEN SATURATION: 98 % | HEART RATE: 82 BPM | HEIGHT: 65 IN | WEIGHT: 290 LBS | TEMPERATURE: 97 F | BODY MASS INDEX: 48.32 KG/M2 | RESPIRATION RATE: 18 BRPM

## 2021-11-29 DIAGNOSIS — J02.0 STREP THROAT: Primary | ICD-10-CM

## 2021-11-29 DIAGNOSIS — J02.9 SORE THROAT: ICD-10-CM

## 2021-11-29 DIAGNOSIS — J06.9 VIRAL UPPER RESPIRATORY ILLNESS: ICD-10-CM

## 2021-11-29 PROCEDURE — 3079F DIAST BP 80-89 MM HG: CPT | Performed by: NURSE PRACTITIONER

## 2021-11-29 PROCEDURE — 3008F BODY MASS INDEX DOCD: CPT | Performed by: NURSE PRACTITIONER

## 2021-11-29 PROCEDURE — 3074F SYST BP LT 130 MM HG: CPT | Performed by: NURSE PRACTITIONER

## 2021-11-29 PROCEDURE — 99213 OFFICE O/P EST LOW 20 MIN: CPT | Performed by: NURSE PRACTITIONER

## 2021-11-29 PROCEDURE — 87880 STREP A ASSAY W/OPTIC: CPT | Performed by: NURSE PRACTITIONER

## 2021-11-29 RX ORDER — AZITHROMYCIN 250 MG/1
TABLET, FILM COATED ORAL
Qty: 6 TABLET | Refills: 0 | Status: SHIPPED | OUTPATIENT
Start: 2021-11-29 | End: 2021-12-04

## 2021-11-29 NOTE — PATIENT INSTRUCTIONS
Viral Upper Respiratory Illness (Adult)    You have a viral upper respiratory illness (URI), which is another term for the common cold. This illness is contagious during the first few days. It is spread through the air by coughing and sneezing.  It may al decongestants if you have high blood pressure.)  Follow-up care  Follow up with your healthcare provider, or as advised.   When to seek medical advice  Call your healthcare provider right away if any of these occur:  · Cough with lots of colored sputum (muc germs from developing. If you were given an antibiotic shot, you don't need any more antibiotics. · You may use acetaminophen or ibuprofen to control pain or fever, unless another medicine was prescribed for this.  Talk with your healthcare provider before not intended as a substitute for professional medical care. Always follow your healthcare professional's instructions.

## 2021-11-29 NOTE — PROGRESS NOTES
CHIEF COMPLAINT:   Patient presents with:  Sore Throat  Nasal Congestion      HPI:   Mary Lou Mancera is a 32year old female presents to clinic with symptoms of sore throat. Patient has had for 3 days. Symptoms have worsening since onset.   Margie denies ear pain, See HPI  RESPIRATORY: denies shortness of breath, or wheezing  CARDIOVASCULAR: denies chest pain, palpitations   GI: denies abdominal pain, constipation and diarrhea  NEURO: denies dizziness or lightheadedness    EXAM:   /80   Pulse tablets (500 mg total) by mouth daily for 1 day, THEN 1 tablet (250 mg total) daily for 4 days. Dispense: 6 tablet; Refill: 0    Plan:  Meds and instructions as listed below. Risks, benefits, complications and side effects of meds discussed with patient. taking blood-thinning medicines, talk with your healthcare provider before using these medicines. Aspirin should never be given to anyone under 25years of age who is ill with a viral infection or fever. It may cause severe liver or brain damage.   · Your a touching your mouth or nose. Symptoms include throat pain that is worse with swallowing, aching all over, headache, swollen lymph nodes at the front of the neck, and red swollen tonsils sometimes with white patches and fever.  It's treated with antibiotic m becoming soft in the middle  · You have trouble swallowing liquids or you can't open your mouth wide because of throat pain  · Signs of dehydration. These include very dark urine or no urine, sunken eyes, and dizziness.   · Noisy breathing  · Muffled voice

## 2021-12-03 ENCOUNTER — TELEPHONE (OUTPATIENT)
Dept: FAMILY MEDICINE CLINIC | Facility: CLINIC | Age: 26
End: 2021-12-03

## 2021-12-03 DIAGNOSIS — J45.40 MODERATE PERSISTENT ASTHMA WITHOUT COMPLICATION: ICD-10-CM

## 2021-12-03 DIAGNOSIS — U07.1 COVID-19 VIRUS INFECTION: Primary | ICD-10-CM

## 2021-12-03 NOTE — TELEPHONE ENCOUNTER
Called to get update on patient condition. She has some nighttime awakening.  Some dyspnea   Isolating at home

## 2021-12-06 ENCOUNTER — TELEPHONE (OUTPATIENT)
Dept: CASE MANAGEMENT | Age: 26
End: 2021-12-06

## 2021-12-07 NOTE — TELEPHONE ENCOUNTER
Contacted patient to schedule  According to order today is day 10 of patient's symptoms and I confirmed this with patient. There are no available appointments for today at [facility] or [facility].  I encouraged patient to walk in to immediate care of ER to have infusion

## 2021-12-09 NOTE — PROGRESS NOTES
Bedside report from [facility] @ this time. POC discussed and will enforce. Pt stable in bed with IV infusing, sousa draining, epidural infusing, and call light in reach. Pt verbalized understanding of POC. none yes

## 2021-12-10 NOTE — TELEPHONE ENCOUNTER
Patient states that she feels \"fantastic. \" Denies any symptoms at this time. Ok to discontinue from home monitoring? Please advise. Thank you.

## 2022-01-14 NOTE — PROGRESS NOTES
Subjective:   Emeka Cooper is a 32year old female who presents for Depression (f/u)     Depression   On sertraline 100mg - states she is better about compliance   Overall better mood     Asthma   Worse with weather changes   staying on Advair bid calculated from the following:    Height as of 11/29/21: 5' 5\" (1.651 m). Weight as of 11/29/21: 290 lb (131.5 kg). Physical Exam  Constitutional:       Appearance: She is well-developed.    Cardiovascular:      Rate and Rhythm: Normal rate and regular

## 2022-02-15 ENCOUNTER — OFFICE VISIT (OUTPATIENT)
Dept: FAMILY MEDICINE CLINIC | Facility: CLINIC | Age: 27
End: 2022-02-15
Payer: COMMERCIAL

## 2022-02-15 VITALS
DIASTOLIC BLOOD PRESSURE: 64 MMHG | WEIGHT: 290 LBS | HEART RATE: 110 BPM | BODY MASS INDEX: 48.32 KG/M2 | HEIGHT: 65 IN | RESPIRATION RATE: 18 BRPM | TEMPERATURE: 98 F | OXYGEN SATURATION: 98 % | SYSTOLIC BLOOD PRESSURE: 102 MMHG

## 2022-02-15 DIAGNOSIS — J02.9 SORE THROAT: Primary | ICD-10-CM

## 2022-02-15 DIAGNOSIS — Z11.52 ENCOUNTER FOR SCREENING FOR COVID-19: ICD-10-CM

## 2022-02-15 DIAGNOSIS — J02.0 STREP PHARYNGITIS: ICD-10-CM

## 2022-02-15 PROCEDURE — 3074F SYST BP LT 130 MM HG: CPT | Performed by: NURSE PRACTITIONER

## 2022-02-15 PROCEDURE — 3078F DIAST BP <80 MM HG: CPT | Performed by: NURSE PRACTITIONER

## 2022-02-15 PROCEDURE — 99213 OFFICE O/P EST LOW 20 MIN: CPT | Performed by: NURSE PRACTITIONER

## 2022-02-15 PROCEDURE — 3008F BODY MASS INDEX DOCD: CPT | Performed by: NURSE PRACTITIONER

## 2022-02-15 PROCEDURE — 87880 STREP A ASSAY W/OPTIC: CPT | Performed by: NURSE PRACTITIONER

## 2022-02-15 RX ORDER — AZITHROMYCIN 250 MG/1
TABLET, FILM COATED ORAL
Qty: 6 TABLET | Refills: 0 | Status: SHIPPED | OUTPATIENT
Start: 2022-02-15 | End: 2022-02-20

## 2022-02-16 LAB — SARS-COV-2 RNA RESP QL NAA+PROBE: NOT DETECTED

## 2022-03-21 ENCOUNTER — OFFICE VISIT (OUTPATIENT)
Dept: FAMILY MEDICINE CLINIC | Facility: CLINIC | Age: 27
End: 2022-03-21
Payer: COMMERCIAL

## 2022-03-21 VITALS
WEIGHT: 290 LBS | TEMPERATURE: 98 F | HEART RATE: 90 BPM | RESPIRATION RATE: 18 BRPM | BODY MASS INDEX: 48.32 KG/M2 | HEIGHT: 65 IN | OXYGEN SATURATION: 94 %

## 2022-03-21 DIAGNOSIS — J06.9 VIRAL UPPER RESPIRATORY TRACT INFECTION: ICD-10-CM

## 2022-03-21 DIAGNOSIS — J45.901 EXACERBATION OF ASTHMA, UNSPECIFIED ASTHMA SEVERITY, UNSPECIFIED WHETHER PERSISTENT: Primary | ICD-10-CM

## 2022-03-21 PROCEDURE — 99213 OFFICE O/P EST LOW 20 MIN: CPT | Performed by: NURSE PRACTITIONER

## 2022-03-21 PROCEDURE — 3008F BODY MASS INDEX DOCD: CPT | Performed by: NURSE PRACTITIONER

## 2022-03-21 RX ORDER — PREDNISONE 20 MG/1
20 TABLET ORAL 2 TIMES DAILY
Qty: 10 TABLET | Refills: 0 | Status: SHIPPED | OUTPATIENT
Start: 2022-03-21 | End: 2022-03-26

## 2022-03-22 LAB — SARS-COV-2 RNA RESP QL NAA+PROBE: NOT DETECTED

## 2022-04-11 ENCOUNTER — LABORATORY ENCOUNTER (OUTPATIENT)
Dept: LAB | Age: 27
End: 2022-04-11
Attending: FAMILY MEDICINE
Payer: COMMERCIAL

## 2022-04-11 DIAGNOSIS — Z00.00 LABORATORY EXAMINATION ORDERED AS PART OF A ROUTINE GENERAL MEDICAL EXAMINATION: ICD-10-CM

## 2022-04-11 DIAGNOSIS — R73.03 PREDIABETES: ICD-10-CM

## 2022-04-11 LAB
ALBUMIN SERPL-MCNC: 4 G/DL (ref 3.4–5)
ALBUMIN/GLOB SERPL: 1.4 {RATIO} (ref 1–2)
ALP LIVER SERPL-CCNC: 47 U/L
ALT SERPL-CCNC: 24 U/L
ANION GAP SERPL CALC-SCNC: 5 MMOL/L (ref 0–18)
AST SERPL-CCNC: 16 U/L (ref 15–37)
BASOPHILS # BLD AUTO: 0.04 X10(3) UL (ref 0–0.2)
BASOPHILS NFR BLD AUTO: 0.6 %
BILIRUB SERPL-MCNC: 0.4 MG/DL (ref 0.1–2)
BUN BLD-MCNC: 20 MG/DL (ref 7–18)
CALCIUM BLD-MCNC: 9.2 MG/DL (ref 8.5–10.1)
CHLORIDE SERPL-SCNC: 107 MMOL/L (ref 98–112)
CHOLEST SERPL-MCNC: 125 MG/DL (ref ?–200)
CO2 SERPL-SCNC: 28 MMOL/L (ref 21–32)
CREAT BLD-MCNC: 0.89 MG/DL
EOSINOPHIL # BLD AUTO: 0.54 X10(3) UL (ref 0–0.7)
EOSINOPHIL NFR BLD AUTO: 7.9 %
ERYTHROCYTE [DISTWIDTH] IN BLOOD BY AUTOMATED COUNT: 14.7 %
EST. AVERAGE GLUCOSE BLD GHB EST-MCNC: 140 MG/DL (ref 68–126)
FASTING PATIENT LIPID ANSWER: YES
FASTING STATUS PATIENT QL REPORTED: YES
GLOBULIN PLAS-MCNC: 2.8 G/DL (ref 2.8–4.4)
GLUCOSE BLD-MCNC: 121 MG/DL (ref 70–99)
HBA1C MFR BLD: 6.5 % (ref ?–5.7)
HCT VFR BLD AUTO: 41.7 %
HDLC SERPL-MCNC: 43 MG/DL (ref 40–59)
HGB BLD-MCNC: 13.1 G/DL
IMM GRANULOCYTES # BLD AUTO: 0.02 X10(3) UL (ref 0–1)
IMM GRANULOCYTES NFR BLD: 0.3 %
LDLC SERPL CALC-MCNC: 68 MG/DL (ref ?–100)
LYMPHOCYTES # BLD AUTO: 1.64 X10(3) UL (ref 1–4)
LYMPHOCYTES NFR BLD AUTO: 23.9 %
MCH RBC QN AUTO: 24.8 PG (ref 26–34)
MCHC RBC AUTO-ENTMCNC: 31.4 G/DL (ref 31–37)
MCV RBC AUTO: 79 FL
MONOCYTES # BLD AUTO: 0.4 X10(3) UL (ref 0.1–1)
MONOCYTES NFR BLD AUTO: 5.8 %
NEUTROPHILS # BLD AUTO: 4.22 X10 (3) UL (ref 1.5–7.7)
NEUTROPHILS # BLD AUTO: 4.22 X10(3) UL (ref 1.5–7.7)
NEUTROPHILS NFR BLD AUTO: 61.5 %
NONHDLC SERPL-MCNC: 82 MG/DL (ref ?–130)
OSMOLALITY SERPL CALC.SUM OF ELEC: 294 MOSM/KG (ref 275–295)
PLATELET # BLD AUTO: 262 10(3)UL (ref 150–450)
POTASSIUM SERPL-SCNC: 4.3 MMOL/L (ref 3.5–5.1)
PROT SERPL-MCNC: 6.8 G/DL (ref 6.4–8.2)
RBC # BLD AUTO: 5.28 X10(6)UL
SODIUM SERPL-SCNC: 140 MMOL/L (ref 136–145)
TRIGL SERPL-MCNC: 67 MG/DL (ref 30–149)
TSI SER-ACNC: 1.14 MIU/ML (ref 0.36–3.74)
VLDLC SERPL CALC-MCNC: 10 MG/DL (ref 0–30)
WBC # BLD AUTO: 6.9 X10(3) UL (ref 4–11)

## 2022-04-11 PROCEDURE — 3044F HG A1C LEVEL LT 7.0%: CPT | Performed by: FAMILY MEDICINE

## 2022-04-11 PROCEDURE — 36415 COLL VENOUS BLD VENIPUNCTURE: CPT

## 2022-04-11 PROCEDURE — 85025 COMPLETE CBC W/AUTO DIFF WBC: CPT

## 2022-04-11 PROCEDURE — 84443 ASSAY THYROID STIM HORMONE: CPT

## 2022-04-11 PROCEDURE — 80061 LIPID PANEL: CPT

## 2022-04-11 PROCEDURE — 80053 COMPREHEN METABOLIC PANEL: CPT

## 2022-04-11 PROCEDURE — 83036 HEMOGLOBIN GLYCOSYLATED A1C: CPT

## 2022-04-13 ENCOUNTER — OFFICE VISIT (OUTPATIENT)
Dept: FAMILY MEDICINE CLINIC | Facility: CLINIC | Age: 27
End: 2022-04-13
Payer: COMMERCIAL

## 2022-04-13 VITALS
OXYGEN SATURATION: 99 % | RESPIRATION RATE: 21 BRPM | TEMPERATURE: 98 F | WEIGHT: 293 LBS | SYSTOLIC BLOOD PRESSURE: 100 MMHG | HEIGHT: 65 IN | DIASTOLIC BLOOD PRESSURE: 66 MMHG | BODY MASS INDEX: 48.82 KG/M2 | HEART RATE: 85 BPM

## 2022-04-13 DIAGNOSIS — J45.40 MODERATE PERSISTENT ASTHMA WITHOUT COMPLICATION: ICD-10-CM

## 2022-04-13 DIAGNOSIS — E11.9 TYPE 2 DIABETES MELLITUS WITHOUT COMPLICATION, WITHOUT LONG-TERM CURRENT USE OF INSULIN (HCC): Primary | ICD-10-CM

## 2022-04-13 DIAGNOSIS — F33.1 MODERATE EPISODE OF RECURRENT MAJOR DEPRESSIVE DISORDER (HCC): ICD-10-CM

## 2022-04-13 PROBLEM — O24.319 PREGESTATIONAL DIABETES MELLITUS, MODIFIED WHITE CLASS B: Status: RESOLVED | Noted: 2020-11-06 | Resolved: 2022-04-13

## 2022-04-13 PROBLEM — O24.319 PREGESTATIONAL DIABETES MELLITUS, MODIFIED WHITE CLASS B (HCC): Status: RESOLVED | Noted: 2020-11-06 | Resolved: 2022-04-13

## 2022-04-13 PROCEDURE — 99214 OFFICE O/P EST MOD 30 MIN: CPT | Performed by: FAMILY MEDICINE

## 2022-04-13 PROCEDURE — 3008F BODY MASS INDEX DOCD: CPT | Performed by: FAMILY MEDICINE

## 2022-04-13 PROCEDURE — 3078F DIAST BP <80 MM HG: CPT | Performed by: FAMILY MEDICINE

## 2022-04-13 PROCEDURE — 3074F SYST BP LT 130 MM HG: CPT | Performed by: FAMILY MEDICINE

## 2022-04-13 RX ORDER — SEMAGLUTIDE 1.34 MG/ML
0.25 INJECTION, SOLUTION SUBCUTANEOUS
Qty: 1.5 ML | Refills: 0 | Status: SHIPPED | OUTPATIENT
Start: 2022-04-13 | End: 2022-05-11

## 2022-04-13 RX ORDER — FLUTICASONE PROPIONATE AND SALMETEROL 250; 50 UG/1; UG/1
1 POWDER RESPIRATORY (INHALATION) EVERY 12 HOURS SCHEDULED
Qty: 3 EACH | Refills: 3 | Status: SHIPPED | OUTPATIENT
Start: 2022-04-13

## 2022-04-13 RX ORDER — LANCETS 33 GAUGE
1 EACH MISCELLANEOUS ONCE
Qty: 100 EACH | Refills: 0 | Status: CANCELLED | OUTPATIENT
Start: 2022-04-13 | End: 2022-04-13

## 2022-04-13 RX ORDER — PEN NEEDLE, DIABETIC 30 GX3/16"
1 NEEDLE, DISPOSABLE MISCELLANEOUS
Qty: 30 EACH | Refills: 0 | Status: SHIPPED | OUTPATIENT
Start: 2022-04-13

## 2022-04-13 RX ORDER — BLOOD-GLUCOSE METER
1 EACH MISCELLANEOUS ONCE
Qty: 1 KIT | Refills: 0 | Status: CANCELLED | OUTPATIENT
Start: 2022-04-13 | End: 2022-04-13

## 2022-04-13 RX ORDER — BLOOD SUGAR DIAGNOSTIC
STRIP MISCELLANEOUS
Qty: 100 STRIP | Refills: 0 | Status: CANCELLED | OUTPATIENT
Start: 2022-04-13 | End: 2023-04-13

## 2022-06-21 ENCOUNTER — TELEPHONE (OUTPATIENT)
Dept: FAMILY MEDICINE CLINIC | Facility: CLINIC | Age: 27
End: 2022-06-21

## 2022-07-29 ENCOUNTER — LABORATORY ENCOUNTER (OUTPATIENT)
Dept: LAB | Age: 27
End: 2022-07-29
Attending: FAMILY MEDICINE
Payer: COMMERCIAL

## 2022-07-29 DIAGNOSIS — E11.9 TYPE 2 DIABETES MELLITUS WITHOUT COMPLICATION, WITHOUT LONG-TERM CURRENT USE OF INSULIN (HCC): ICD-10-CM

## 2022-07-29 LAB
ALBUMIN SERPL-MCNC: 3.9 G/DL (ref 3.4–5)
ALBUMIN/GLOB SERPL: 1.3 {RATIO} (ref 1–2)
ALP LIVER SERPL-CCNC: 44 U/L
ALT SERPL-CCNC: 22 U/L
ANION GAP SERPL CALC-SCNC: 8 MMOL/L (ref 0–18)
AST SERPL-CCNC: 10 U/L (ref 15–37)
BILIRUB SERPL-MCNC: 0.4 MG/DL (ref 0.1–2)
BUN BLD-MCNC: 15 MG/DL (ref 7–18)
CALCIUM BLD-MCNC: 9.2 MG/DL (ref 8.5–10.1)
CHLORIDE SERPL-SCNC: 109 MMOL/L (ref 98–112)
CO2 SERPL-SCNC: 23 MMOL/L (ref 21–32)
CREAT BLD-MCNC: 0.86 MG/DL
EST. AVERAGE GLUCOSE BLD GHB EST-MCNC: 131 MG/DL (ref 68–126)
FASTING STATUS PATIENT QL REPORTED: YES
GLOBULIN PLAS-MCNC: 3 G/DL (ref 2.8–4.4)
GLUCOSE BLD-MCNC: 113 MG/DL (ref 70–99)
HBA1C MFR BLD: 6.2 % (ref ?–5.7)
OSMOLALITY SERPL CALC.SUM OF ELEC: 292 MOSM/KG (ref 275–295)
POTASSIUM SERPL-SCNC: 4 MMOL/L (ref 3.5–5.1)
PROT SERPL-MCNC: 6.9 G/DL (ref 6.4–8.2)
SODIUM SERPL-SCNC: 140 MMOL/L (ref 136–145)

## 2022-07-29 PROCEDURE — 83036 HEMOGLOBIN GLYCOSYLATED A1C: CPT

## 2022-07-29 PROCEDURE — 36415 COLL VENOUS BLD VENIPUNCTURE: CPT

## 2022-07-29 PROCEDURE — 80053 COMPREHEN METABOLIC PANEL: CPT

## 2022-08-01 ENCOUNTER — TELEPHONE (OUTPATIENT)
Dept: FAMILY MEDICINE CLINIC | Facility: CLINIC | Age: 27
End: 2022-08-01

## 2022-08-01 DIAGNOSIS — E11.9 TYPE 2 DIABETES MELLITUS WITHOUT COMPLICATION, WITHOUT LONG-TERM CURRENT USE OF INSULIN (HCC): Primary | ICD-10-CM

## 2022-08-01 NOTE — TELEPHONE ENCOUNTER
----- Message from Frederic Goel MD sent at 7/31/2022 10:27 PM CDT -----  Stable sugars, recheck in 6 months.  CPM     mychart to pt and labs in system

## 2022-08-05 NOTE — PROGRESS NOTES
Telehealth outside of [address] Verbal Consent   I conducted a telehealth visit with Jerrica Santos today, 08/05/22, which was completed using audio only communication. This has been done in good jose to provide continuity of care in the best interest of the provider-patient relationship, due to the COVID -19 public health crisis/national emergency where restrictions of face-to-face office visits are ongoing. Every conscious effort was taken to allow for sufficient and adequate time to complete the visit. The patient was made aware of the limitations of the telehealth visit, including treatment limitations as no physical exam could be performed. The patient was advised to call 911 or to go to the ER in case there was an emergency. The patient was also advised of the potential privacy & security concerns related to the telehealth platform. The patient was made aware of where to find [facility] notice of privacy practices, telehealth consent form and other related consent forms and documents. which are located on the [facility] website. The patient verbally agreed to telehealth consent form, related consents and the risks discussed. Lastly, the patient confirmed that they were in [place]. Included in this visit, time may have been spent reviewing labs, medications, radiology tests and decision making. Appropriate medical decision-making and tests are ordered as detailed in the plan of care above. Coding/billing information is submitted for this visit based on complexity of care and/or time spent for the visit. Duration of the service: 19 minutes      Summary of topics discussed:     Depression flare up   States she is at 3 week tiffanie of not taking medication   She states she just slowly spiraled into forgetting and not wanting or feeling like she could take medication.    Denies thoughts of self harm, or hurting others   States she is overly emotional and loses control of emotions - states amy after work. Not eating as much as she used to (but also on GLP1)   Energy seems normal, but does have some insomnia issues   Having difficulty concentrating at work/home   Gets excited to do activities with kids and follows thru on that but it requires more effort to complete     Is remembering to take advair   Is remembering GLP1 weekly injection       A/P    Diagnoses and all orders for this visit:    Moderate episode of recurrent major depressive disorder (HCC)  -     escitalopram 10 MG Oral Tab; Take 1 tablet (10 mg total) by mouth daily. Patient no longer taking sertraline   Will try lexapro instead   May need to try SNRI or viibryd, trazodone etc if sx still not improving    Moderate persistent asthma without complication  -     albuterol (PROAIR HFA) 108 (90 Base) MCG/ACT Inhalation Aero Soln; INHALE 1 TO 2 PUFFS BY MOUTH EVERY 4 TO 6 HOURS AS NEEDED      ER precautions discussed. Patient verbalized understanding and agrees to plan     Return in about 6 weeks (around 9/16/2022).         Nguyen Camp MD

## 2022-08-09 ENCOUNTER — PATIENT MESSAGE (OUTPATIENT)
Dept: FAMILY MEDICINE CLINIC | Facility: CLINIC | Age: 27
End: 2022-08-09

## 2022-08-09 NOTE — TELEPHONE ENCOUNTER
From: Linda Arevalo  To: Tirso Martini MD  Sent: 8/9/2022 1:23 PM CDT  Subject: For school     I have to fill out a medical form and do a tab test. Can someone call to schedule a time?  Hopefully on a Friday

## 2022-08-12 ENCOUNTER — PATIENT MESSAGE (OUTPATIENT)
Dept: FAMILY MEDICINE CLINIC | Facility: CLINIC | Age: 27
End: 2022-08-12

## 2022-08-12 DIAGNOSIS — Z02.83 ENCOUNTER FOR DRUG SCREENING: ICD-10-CM

## 2022-08-12 DIAGNOSIS — Z11.1 SCREENING FOR TUBERCULOSIS: Primary | ICD-10-CM

## 2022-08-12 NOTE — TELEPHONE ENCOUNTER
From: Emilia Avila  Sent: 8/12/2022 5:31 AM CDT  To: [facility] Clinical Staff  Subject: Bautista Norton can I just drop off the paper and doctor order a drug panel for me? pyelo

## 2022-08-17 NOTE — TELEPHONE ENCOUNTER
Lab order placed. (Patient needs TB test.)    Notified the patient that lab order placed. Patient verbalized understanding. States that she would complete the blood draw on Friday. Also needs a 10 panel drug screen. Explained that [facility] only has 7 panel, so she would have to go to [place], but an order can be placed. Patient stated that she does not want to go to [place]. Will speak with instructor and where else she can do drug screen. School form placed in [employer] folder.

## 2022-08-19 ENCOUNTER — LAB ENCOUNTER (OUTPATIENT)
Dept: LAB | Age: 27
End: 2022-08-19
Attending: FAMILY MEDICINE
Payer: COMMERCIAL

## 2022-08-19 ENCOUNTER — PATIENT MESSAGE (OUTPATIENT)
Dept: FAMILY MEDICINE CLINIC | Facility: CLINIC | Age: 27
End: 2022-08-19

## 2022-08-19 DIAGNOSIS — Z02.83 ENCOUNTER FOR DRUG SCREENING: ICD-10-CM

## 2022-08-19 DIAGNOSIS — Z11.1 SCREENING FOR TUBERCULOSIS: ICD-10-CM

## 2022-08-19 DIAGNOSIS — E11.9 TYPE 2 DIABETES MELLITUS WITHOUT COMPLICATION, WITHOUT LONG-TERM CURRENT USE OF INSULIN (HCC): ICD-10-CM

## 2022-08-19 LAB
AMPHET UR QL SCN: NEGATIVE
BENZODIAZ UR QL SCN: NEGATIVE
CANNABINOIDS UR QL SCN: NEGATIVE
COCAINE UR QL: NEGATIVE
CREAT UR-SCNC: 105 MG/DL
CREAT UR-SCNC: 106 MG/DL
MDMA UR QL SCN: NEGATIVE
MICROALBUMIN UR-MCNC: 3.71 MG/DL
MICROALBUMIN/CREAT 24H UR-RTO: 35 UG/MG (ref ?–30)
OPIATES UR QL SCN: NEGATIVE
OXYCODONE UR QL SCN: NEGATIVE

## 2022-08-19 PROCEDURE — 86480 TB TEST CELL IMMUN MEASURE: CPT

## 2022-08-19 PROCEDURE — 80307 DRUG TEST PRSMV CHEM ANLYZR: CPT

## 2022-08-19 PROCEDURE — 82043 UR ALBUMIN QUANTITATIVE: CPT

## 2022-08-19 PROCEDURE — 36415 COLL VENOUS BLD VENIPUNCTURE: CPT

## 2022-08-19 PROCEDURE — 82570 ASSAY OF URINE CREATININE: CPT

## 2022-08-22 LAB
M TB IFN-G CD4+ T-CELLS BLD-ACNC: 0.02 IU/ML
M TB TUBERC IFN-G BLD QL: NEGATIVE
M TB TUBERC IGNF/MITOGEN IGNF CONTROL: >10 IU/ML
QFT TB1 AG MINUS NIL: 0.01 IU/ML
QFT TB2 AG MINUS NIL: 0 IU/ML

## 2022-08-24 NOTE — TELEPHONE ENCOUNTER
Called and spoke to patient and stated form is ready for  and placed at the . Copy sent to scanned, additional copy placed in back office.

## 2022-08-25 ENCOUNTER — MED REC SCAN ONLY (OUTPATIENT)
Dept: FAMILY MEDICINE CLINIC | Facility: CLINIC | Age: 27
End: 2022-08-25

## 2022-09-08 ENCOUNTER — TELEPHONE (OUTPATIENT)
Dept: FAMILY MEDICINE CLINIC | Facility: CLINIC | Age: 27
End: 2022-09-08

## 2022-09-08 DIAGNOSIS — Z02.83 ENCOUNTER FOR DRUG SCREENING: Primary | ICD-10-CM

## 2022-09-09 ENCOUNTER — PATIENT MESSAGE (OUTPATIENT)
Dept: FAMILY MEDICINE CLINIC | Facility: CLINIC | Age: 27
End: 2022-09-09

## 2022-09-09 NOTE — TELEPHONE ENCOUNTER
From: Jonas Cunningham  Sent: 9/9/2022 7:38 AM CDT  To: [facility] Clinical Staff  Subject: Drug test    Okay thank you so much!

## 2022-10-25 RX ORDER — ESCITALOPRAM OXALATE 10 MG/1
TABLET ORAL
Qty: 30 TABLET | Refills: 2 | Status: SHIPPED | OUTPATIENT
Start: 2022-10-25

## 2022-12-27 ENCOUNTER — HOSPITAL ENCOUNTER (EMERGENCY)
Age: 27
Discharge: HOME | End: 2022-12-27
Payer: COMMERCIAL

## 2022-12-27 VITALS
DIASTOLIC BLOOD PRESSURE: 71 MMHG | HEART RATE: 70 BPM | TEMPERATURE: 97 F | SYSTOLIC BLOOD PRESSURE: 116 MMHG | WEIGHT: 273 LBS | HEIGHT: 65 IN | BODY MASS INDEX: 45.48 KG/M2 | RESPIRATION RATE: 16 BRPM | OXYGEN SATURATION: 98 %

## 2022-12-27 DIAGNOSIS — S51.812A LACERATION OF LEFT FOREARM, INITIAL ENCOUNTER: Primary | ICD-10-CM

## 2022-12-27 PROCEDURE — 12001 RPR S/N/AX/GEN/TRNK 2.5CM/<: CPT

## 2022-12-27 PROCEDURE — 90471 IMMUNIZATION ADMIN: CPT

## 2022-12-27 PROCEDURE — 99283 EMERGENCY DEPT VISIT LOW MDM: CPT
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
## 2022-12-27 NOTE — DISCHARGE INSTRUCTIONS
Return to the ER if worse or any concerns  Suture removal in 10  days  Keep wound clean and dry. Apply triple ointment antibiotic to the affected area twice a day  Scar may remain, keep scar out of the sun.

## 2023-01-03 DIAGNOSIS — J45.40 MODERATE PERSISTENT ASTHMA WITHOUT COMPLICATION: ICD-10-CM

## 2023-01-03 RX ORDER — ESCITALOPRAM OXALATE 10 MG/1
10 TABLET ORAL DAILY
Qty: 30 TABLET | Refills: 0 | Status: SHIPPED | OUTPATIENT
Start: 2023-01-03

## 2023-01-03 RX ORDER — ALBUTEROL SULFATE 90 UG/1
AEROSOL, METERED RESPIRATORY (INHALATION)
Qty: 8.5 G | Refills: 0 | Status: SHIPPED | OUTPATIENT
Start: 2023-01-03

## 2023-01-03 NOTE — TELEPHONE ENCOUNTER
Requesting refill on Lexapro and Proair. Last VV 8/5/2022. Having insurance issue. Please approve or deny pending rx. Thank you.

## 2023-01-03 NOTE — TELEPHONE ENCOUNTER
ESCITALOPRAM 10 MG Oral Tab    albuterol (PROAIR HFA) 108 (90 Base) MCG/ACT Inhalation Aero Soln    [facility]    LOV : 4/13/22    VV:8/5/22    Pt had appt but cant make it due to new insurance issues, shes trying to fix it so she can come in

## 2023-02-05 NOTE — ED INITIAL ASSESSMENT (HPI)
r ear pain x 3 days, when she coughs her r. Ear pops. Cough dry non productive. Denies fevers.
impairments found

## 2023-03-12 NOTE — PROGRESS NOTES
Called to discuss case with RN for circumcision--baby under phototherapy for next 4 hours, cannot have circ until after 8 PM.  Family requests that this be done early tomorrow.   If I am available at 8 PM, we can do then, otherwise, will be done at some poi [employer]

## 2023-03-22 ENCOUNTER — PATIENT MESSAGE (OUTPATIENT)
Dept: FAMILY MEDICINE CLINIC | Facility: CLINIC | Age: 28
End: 2023-03-22

## 2023-03-23 NOTE — TELEPHONE ENCOUNTER
From: Fan Dalal  To: Coleman Heck MD  Sent: 3/22/2023 2:54 PM CDT  Subject: Labs for upcoming appointment    This message is being sent by Mindi Hand on behalf of Fan Dalal.     Hi I was curious if there is labs for me to do before my next appointment

## 2023-04-05 DIAGNOSIS — E11.9 TYPE 2 DIABETES MELLITUS WITHOUT COMPLICATION, WITHOUT LONG-TERM CURRENT USE OF INSULIN (HCC): Primary | ICD-10-CM

## 2023-04-05 DIAGNOSIS — Z00.00 LABORATORY EXAMINATION ORDERED AS PART OF A ROUTINE GENERAL MEDICAL EXAMINATION: ICD-10-CM

## 2023-04-08 ENCOUNTER — LABORATORY ENCOUNTER (OUTPATIENT)
Dept: LAB | Age: 28
End: 2023-04-08
Attending: FAMILY MEDICINE
Payer: COMMERCIAL

## 2023-04-08 DIAGNOSIS — Z00.00 LABORATORY EXAMINATION ORDERED AS PART OF A ROUTINE GENERAL MEDICAL EXAMINATION: ICD-10-CM

## 2023-04-08 DIAGNOSIS — E11.9 TYPE 2 DIABETES MELLITUS WITHOUT COMPLICATION, WITHOUT LONG-TERM CURRENT USE OF INSULIN (HCC): ICD-10-CM

## 2023-04-08 LAB
ALBUMIN SERPL-MCNC: 4 G/DL (ref 3.4–5)
ALBUMIN/GLOB SERPL: 1.2 {RATIO} (ref 1–2)
ALP LIVER SERPL-CCNC: 51 U/L
ALT SERPL-CCNC: 22 U/L
ANION GAP SERPL CALC-SCNC: 9 MMOL/L (ref 0–18)
AST SERPL-CCNC: 15 U/L (ref 15–37)
BASOPHILS # BLD AUTO: 0.09 X10(3) UL (ref 0–0.2)
BASOPHILS NFR BLD AUTO: 1.1 %
BILIRUB SERPL-MCNC: 0.5 MG/DL (ref 0.1–2)
BUN BLD-MCNC: 13 MG/DL (ref 7–18)
CALCIUM BLD-MCNC: 9 MG/DL (ref 8.5–10.1)
CHLORIDE SERPL-SCNC: 108 MMOL/L (ref 98–112)
CHOLEST SERPL-MCNC: 133 MG/DL (ref ?–200)
CO2 SERPL-SCNC: 24 MMOL/L (ref 21–32)
CREAT BLD-MCNC: 0.91 MG/DL
CREAT UR-SCNC: 151 MG/DL
EOSINOPHIL # BLD AUTO: 0.62 X10(3) UL (ref 0–0.7)
EOSINOPHIL NFR BLD AUTO: 7.6 %
ERYTHROCYTE [DISTWIDTH] IN BLOOD BY AUTOMATED COUNT: 13.2 %
EST. AVERAGE GLUCOSE BLD GHB EST-MCNC: 128 MG/DL (ref 68–126)
FASTING PATIENT LIPID ANSWER: YES
FASTING STATUS PATIENT QL REPORTED: YES
GFR SERPLBLD BASED ON 1.73 SQ M-ARVRAT: 89 ML/MIN/1.73M2 (ref 60–?)
GLOBULIN PLAS-MCNC: 3.4 G/DL (ref 2.8–4.4)
GLUCOSE BLD-MCNC: 158 MG/DL (ref 70–99)
HBA1C MFR BLD: 6.1 % (ref ?–5.7)
HCT VFR BLD AUTO: 42.3 %
HDLC SERPL-MCNC: 46 MG/DL (ref 40–59)
HGB BLD-MCNC: 13.8 G/DL
IMM GRANULOCYTES # BLD AUTO: 0.02 X10(3) UL (ref 0–1)
IMM GRANULOCYTES NFR BLD: 0.2 %
LDLC SERPL CALC-MCNC: 72 MG/DL (ref ?–100)
LYMPHOCYTES # BLD AUTO: 2.45 X10(3) UL (ref 1–4)
LYMPHOCYTES NFR BLD AUTO: 29.9 %
MCH RBC QN AUTO: 26.8 PG (ref 26–34)
MCHC RBC AUTO-ENTMCNC: 32.6 G/DL (ref 31–37)
MCV RBC AUTO: 82.3 FL
MICROALBUMIN UR-MCNC: 2.72 MG/DL
MICROALBUMIN/CREAT 24H UR-RTO: 18 UG/MG (ref ?–30)
MONOCYTES # BLD AUTO: 0.37 X10(3) UL (ref 0.1–1)
MONOCYTES NFR BLD AUTO: 4.5 %
NEUTROPHILS # BLD AUTO: 4.64 X10 (3) UL (ref 1.5–7.7)
NEUTROPHILS # BLD AUTO: 4.64 X10(3) UL (ref 1.5–7.7)
NEUTROPHILS NFR BLD AUTO: 56.7 %
NONHDLC SERPL-MCNC: 87 MG/DL (ref ?–130)
OSMOLALITY SERPL CALC.SUM OF ELEC: 295 MOSM/KG (ref 275–295)
PLATELET # BLD AUTO: 258 10(3)UL (ref 150–450)
POTASSIUM SERPL-SCNC: 3.7 MMOL/L (ref 3.5–5.1)
PROT SERPL-MCNC: 7.4 G/DL (ref 6.4–8.2)
RBC # BLD AUTO: 5.14 X10(6)UL
SODIUM SERPL-SCNC: 141 MMOL/L (ref 136–145)
TRIGL SERPL-MCNC: 76 MG/DL (ref 30–149)
TSI SER-ACNC: 0.76 MIU/ML (ref 0.36–3.74)
VLDLC SERPL CALC-MCNC: 12 MG/DL (ref 0–30)
WBC # BLD AUTO: 8.2 X10(3) UL (ref 4–11)

## 2023-04-08 PROCEDURE — 36415 COLL VENOUS BLD VENIPUNCTURE: CPT

## 2023-04-08 PROCEDURE — 85025 COMPLETE CBC W/AUTO DIFF WBC: CPT

## 2023-04-08 PROCEDURE — 80061 LIPID PANEL: CPT

## 2023-04-08 PROCEDURE — 83036 HEMOGLOBIN GLYCOSYLATED A1C: CPT

## 2023-04-08 PROCEDURE — 84443 ASSAY THYROID STIM HORMONE: CPT

## 2023-04-08 PROCEDURE — 80053 COMPREHEN METABOLIC PANEL: CPT

## 2023-04-08 PROCEDURE — 82043 UR ALBUMIN QUANTITATIVE: CPT

## 2023-04-08 PROCEDURE — 82570 ASSAY OF URINE CREATININE: CPT

## 2023-04-08 PROCEDURE — 3044F HG A1C LEVEL LT 7.0%: CPT | Performed by: FAMILY MEDICINE

## 2023-04-08 PROCEDURE — 3061F NEG MICROALBUMINURIA REV: CPT | Performed by: FAMILY MEDICINE

## 2023-04-14 ENCOUNTER — OFFICE VISIT (OUTPATIENT)
Dept: OBGYN CLINIC | Facility: CLINIC | Age: 28
End: 2023-04-14
Payer: COMMERCIAL

## 2023-04-14 ENCOUNTER — OFFICE VISIT (OUTPATIENT)
Dept: FAMILY MEDICINE CLINIC | Facility: CLINIC | Age: 28
End: 2023-04-14
Payer: COMMERCIAL

## 2023-04-14 VITALS
DIASTOLIC BLOOD PRESSURE: 78 MMHG | SYSTOLIC BLOOD PRESSURE: 104 MMHG | BODY MASS INDEX: 46.48 KG/M2 | OXYGEN SATURATION: 98 % | HEART RATE: 94 BPM | HEIGHT: 65 IN | RESPIRATION RATE: 21 BRPM | TEMPERATURE: 98 F | WEIGHT: 279 LBS

## 2023-04-14 VITALS
DIASTOLIC BLOOD PRESSURE: 88 MMHG | SYSTOLIC BLOOD PRESSURE: 124 MMHG | WEIGHT: 279.19 LBS | HEIGHT: 65 IN | BODY MASS INDEX: 46.52 KG/M2 | HEART RATE: 92 BPM

## 2023-04-14 DIAGNOSIS — Z12.4 SCREENING FOR CERVICAL CANCER: ICD-10-CM

## 2023-04-14 DIAGNOSIS — J45.40 MODERATE PERSISTENT ASTHMA WITHOUT COMPLICATION: ICD-10-CM

## 2023-04-14 DIAGNOSIS — F33.1 MODERATE EPISODE OF RECURRENT MAJOR DEPRESSIVE DISORDER (HCC): ICD-10-CM

## 2023-04-14 DIAGNOSIS — Z01.419 ENCOUNTER FOR GYNECOLOGICAL EXAMINATION WITHOUT ABNORMAL FINDING: Primary | ICD-10-CM

## 2023-04-14 DIAGNOSIS — E11.9 TYPE 2 DIABETES MELLITUS WITHOUT COMPLICATION, WITHOUT LONG-TERM CURRENT USE OF INSULIN (HCC): ICD-10-CM

## 2023-04-14 DIAGNOSIS — Z00.00 ROUTINE PHYSICAL EXAMINATION: Primary | ICD-10-CM

## 2023-04-14 PROCEDURE — 99395 PREV VISIT EST AGE 18-39: CPT | Performed by: FAMILY MEDICINE

## 2023-04-14 PROCEDURE — 3079F DIAST BP 80-89 MM HG: CPT | Performed by: OBSTETRICS & GYNECOLOGY

## 2023-04-14 PROCEDURE — 99395 PREV VISIT EST AGE 18-39: CPT | Performed by: OBSTETRICS & GYNECOLOGY

## 2023-04-14 PROCEDURE — 3008F BODY MASS INDEX DOCD: CPT | Performed by: OBSTETRICS & GYNECOLOGY

## 2023-04-14 PROCEDURE — 3078F DIAST BP <80 MM HG: CPT | Performed by: FAMILY MEDICINE

## 2023-04-14 PROCEDURE — 3074F SYST BP LT 130 MM HG: CPT | Performed by: FAMILY MEDICINE

## 2023-04-14 PROCEDURE — 3008F BODY MASS INDEX DOCD: CPT | Performed by: FAMILY MEDICINE

## 2023-04-14 PROCEDURE — 3074F SYST BP LT 130 MM HG: CPT | Performed by: OBSTETRICS & GYNECOLOGY

## 2023-04-14 RX ORDER — FLUTICASONE PROPIONATE AND SALMETEROL 250; 50 UG/1; UG/1
1 POWDER RESPIRATORY (INHALATION) EVERY 12 HOURS SCHEDULED
Qty: 60 EACH | Refills: 0 | Status: SHIPPED | OUTPATIENT
Start: 2023-04-14

## 2023-04-14 RX ORDER — ESCITALOPRAM OXALATE 10 MG/1
10 TABLET ORAL 2 TIMES DAILY
Qty: 180 TABLET | Refills: 1 | Status: SHIPPED | OUTPATIENT
Start: 2023-04-14

## 2023-04-14 RX ORDER — SEMAGLUTIDE 0.68 MG/ML
0.5 INJECTION, SOLUTION SUBCUTANEOUS
Qty: 3 ML | Refills: 5 | Status: SHIPPED | OUTPATIENT
Start: 2023-04-14

## 2023-04-14 NOTE — PROGRESS NOTES
Subjective:  Patient presents with:  Physical    32year old female who presents for annual well woman visit without complaints. Asking about hysterectomy because concerns about accidental pregnancy. Got pregnant with IUD previously. No menses with Mirena. Patient's last menstrual period was 12/20/2022 (approximate). Hx Prior Abnormal Pap: Yes (LSIL)  Pap Date: 02/20/20  Pap Result Notes: wnl  Follow Up Recommendation: repeat pap , last pap 2/20/20 WNL  Menarche: 11 (4/14/2023 10:24 AM)  Period Cycle (Days): 30 days currently pregnant (4/14/2023 10:24 AM)  Period Duration (Days): 4-5 days (4/14/2023 10:24 AM)  Period Flow: moderate (4/14/2023 10:24 AM)  Date When Birth Control Last Used: mirena for 4 years (4/14/2023 10:24 AM)  Hx Prior Abnormal Pap: Yes (LSIL) (4/14/2023 10:24 AM)  Pap Date: 02/20/20 (4/14/2023 10:24 AM)  Pap Result Notes: wnl (4/14/2023 10:24 AM)  Follow Up Recommendation: repeat pap , last pap 2/20/20 WNL (4/14/2023 10:24 AM)      Last Pap smear: 2020     Abnormal Pap: n    Pelvic Infections/STD: None  Contraception: Mirena    Review of Systems:  Pertinent items are noted in the HPI. Patient History:  New Medical Illness: None  New Surgeries: None  New Family History: None   reports that she has never smoked. She has never used smokeless tobacco.   reports that she does not currently use alcohol.     Most Recent Immunizations  Administered            Date(s) Administered    Covid-19 Vaccine Pfizer 30 mcg/0.3 ml                          05/03/2021      DTAP                  08/08/2001      DTAP INFANRIX         02/19/1998      DTP/HIB Combined      07/06/1995      FLU VAC QIV SPLIT 3 YRS AND OLDER (12251)                          11/03/2015      FLULAVAL 6 months & older 0.5 ml Prefilled syringe (44786)                          10/12/2021      FLUZONE 6 months and older PFS 0.5 ml (34310)                          10/27/2022      HEP B                 08/02/2000      HEP B, Ped/Adol 06/01/1995      HIB                   08/02/2000      IPV                   08/08/2001      Influenza             09/28/2017      MMR                   04/17/2000      Medroxyprogesterone Acetate,1mg InJ                          11/03/2015      Meningococcal-Menactra                          08/13/2010      OPV                   02/19/1998      TDAP                  12/27/2022      Tb Intradermal Test   07/28/2017      Varicella             08/25/2017      Varicella Vaccine     08/25/2017      Objective:  /88   Pulse 92   Ht 65\"   Wt 279 lb 3.2 oz (126.6 kg)   LMP 12/20/2022 (Approximate)   Physical Examination:  General appearance: Well dressed, morbidly obese in no apparent distress  Neurologic/Psychiatric: Alert and oriented to person, place and time, mood normal, affect appropriate  Head: Normocephalic without obvious deformity, atraumatic  Neck: No thyromegaly, supple, non-tender, no masses, no adenopathy  Lungs: Clear to auscultation bilaterally, no rales, wheezes or rhonchi  Breasts: Symmetric, non-tender, no masses, lesions, retraction, dimpling or discharge bilaterally, no axillary or supraclavicular lymphadenopathy  Heart[de-identified] Regular rate and rhythm, no gallops or murmurs  Abdomen: Soft, large pannus, non-tender, non-distended, no masses, no hepatosplenomegaly, no hernias, no inguinal lymphadenopathy  Pelvic:    External genitalia- Normal, Bartholin's, urethra, skeins glands normal   Vagina- No vaginal lesions, no discharge   Cervix- No lesions, long/closed, no cervical motion tenderness, IUD strings present   Uterus- Normal sized, anteverted, non-tender, no masses   Adnexa-  Non-tender, no masses  Extremities: Non-tender, full range of motion, no clubbing, cyanosis or edema  Skin:  General inspection- no rashes, lesions or discoloration  Rectum: No hemorrhoids, no masses. Assessment/Plan:  Normal well-woman exam.  HPV/Pap smear obtained. Counseled regarding contraceptive options.   Would not recommend hysterectomy for indication of sterilization only. Reviewed laparoscopic bilateral salpingectomy versus vasectomy. Patient would be at increased risk for surgical difficulties/complications due to morbid obesity and large pannus, so vasectomy would be a much safer option, but if partner unwilling to have vasectomy, patient will call to schedule procedure. Risks, benefits and potential complications of laparoscopic bilateral salpingectomy reviewed, including risks of anesthesia, infection with need for intravenous antibiotics, bleeding with need for blood transfusion, scarring, injury to internal organs including bowel, bladder, ureters and major blood vessels with need for additional surgery, laparotomy and/or prolonged hospitalization. Patient given [facility] information pamphlets on the above procedures. All questions answered and patient agrees to the proposed procedure without reservation. Patient understands that laparoscopic tubal sterilization via bilateral salpingectomy is permanent and irreversible. Patient offered chaperone for exam, declined    Diagnoses and all orders for this visit:    Encounter for gynecological examination without abnormal finding    Screening for cervical cancer  -     THINPREP PAP WITH HPV REFLEX REQUEST B; Future      Return in about 1 year (around 4/14/2024) for Annual Gyn Visit.

## 2023-04-17 ENCOUNTER — TELEPHONE (OUTPATIENT)
Dept: FAMILY MEDICINE CLINIC | Facility: CLINIC | Age: 28
End: 2023-04-17

## 2023-04-17 NOTE — TELEPHONE ENCOUNTER
Received a fax from [address] [place] [address]. Ozempic and Advair diskus are not covered by the patient's insurance. Can initiate a prior authorization by calling [phone]. ID# [id number].

## 2023-04-17 NOTE — TELEPHONE ENCOUNTER
Received a fax from [address] requesting additional information for PA on Advair Diskus. Completed form and faxed to [phone]. Received confirmation fax.

## 2023-04-25 ENCOUNTER — PATIENT MESSAGE (OUTPATIENT)
Dept: FAMILY MEDICINE CLINIC | Facility: CLINIC | Age: 28
End: 2023-04-25

## 2023-04-25 NOTE — TELEPHONE ENCOUNTER
Attempted to call [address] for clarification. Unable to reach pharmacy. Will try again later.

## 2023-04-28 NOTE — TELEPHONE ENCOUNTER
Carmelina Lorenzo. Spoke with Hillary Luong again. Escitaolpram went through insurance.  180 tablets will cost $1.93.

## 2023-04-28 NOTE — TELEPHONE ENCOUNTER
Carmelina Lorenzo. Spoke with Erin Treviño. Confirmed that escitalopram BID requires a PA. Need to call [phone]. ID# [id number].

## 2023-04-28 NOTE — TELEPHONE ENCOUNTER
Notified the patient of PA approval and per pharmacy will only cost $1.93. Patient verbalized understanding and stated that she will  rx.

## 2023-04-30 ENCOUNTER — PATIENT MESSAGE (OUTPATIENT)
Dept: OBGYN CLINIC | Facility: CLINIC | Age: 28
End: 2023-04-30

## 2023-05-01 NOTE — TELEPHONE ENCOUNTER
From: Luis Orellana  To: Rosas Croft MD  Sent: 4/30/2023 2:15 PM CDT  Subject: Question regarding THINPREP PAP WITH HPV REFLEX REQUEST    This message is being sent by Marie Rivas on behalf of Luis Orellana. I am itching, there is irritation, and the discharge has been thick white to thin white discharge. Should I get fungal medication?

## 2023-05-02 RX ORDER — FLUCONAZOLE 150 MG/1
TABLET ORAL
Qty: 2 TABLET | Refills: 0 | Status: SHIPPED | OUTPATIENT
Start: 2023-05-02

## 2023-05-30 ENCOUNTER — OFFICE VISIT (OUTPATIENT)
Dept: FAMILY MEDICINE CLINIC | Facility: CLINIC | Age: 28
End: 2023-05-30
Payer: COMMERCIAL

## 2023-05-30 VITALS
RESPIRATION RATE: 18 BRPM | OXYGEN SATURATION: 98 % | SYSTOLIC BLOOD PRESSURE: 112 MMHG | HEIGHT: 65 IN | TEMPERATURE: 97 F | HEART RATE: 87 BPM | BODY MASS INDEX: 47.98 KG/M2 | WEIGHT: 288 LBS | DIASTOLIC BLOOD PRESSURE: 78 MMHG

## 2023-05-30 DIAGNOSIS — J02.0 STREP PHARYNGITIS: ICD-10-CM

## 2023-05-30 DIAGNOSIS — J02.9 SORE THROAT: Primary | ICD-10-CM

## 2023-05-30 DIAGNOSIS — J30.2 SEASONAL ALLERGIES: ICD-10-CM

## 2023-05-30 LAB
CONTROL LINE PRESENT WITH A CLEAR BACKGROUND (YES/NO): YES YES/NO
KIT LOT #: NORMAL NUMERIC
STREP GRP A CUL-SCR: POSITIVE

## 2023-05-30 PROCEDURE — 3078F DIAST BP <80 MM HG: CPT | Performed by: NURSE PRACTITIONER

## 2023-05-30 PROCEDURE — 87880 STREP A ASSAY W/OPTIC: CPT | Performed by: NURSE PRACTITIONER

## 2023-05-30 PROCEDURE — 3008F BODY MASS INDEX DOCD: CPT | Performed by: NURSE PRACTITIONER

## 2023-05-30 PROCEDURE — 99213 OFFICE O/P EST LOW 20 MIN: CPT | Performed by: NURSE PRACTITIONER

## 2023-05-30 PROCEDURE — 3074F SYST BP LT 130 MM HG: CPT | Performed by: NURSE PRACTITIONER

## 2023-05-30 RX ORDER — AZITHROMYCIN 250 MG/1
TABLET, FILM COATED ORAL
Qty: 6 TABLET | Refills: 0 | Status: SHIPPED | OUTPATIENT
Start: 2023-05-30 | End: 2023-06-04

## 2023-05-30 NOTE — PATIENT INSTRUCTIONS
Please start Zithromax as discussed. Finish all the medication even if you feel better. Probiotics or yogurt taken daily will help replace good bacteria to the gut with antibiotic use. You may take Tylenol or Ibuprofen over the counter for comfort and swelling as directed. Continue allergy medications as discussed. Add in nasal steroid for the next two weeks and use consistently to help with allergies/nasal swelling. Salt water gargles (1/2 coffee mug full of water, microwave for one minute, add 1 teaspoon of salt and swish, gargle, spit until empty) will help reduce pain and swelling. You are considered contagious until 24 hours after the start of your antibiotics. Avoid work or school during this time. Remove your toothbrush and toothpaste from shared areas, do not share towels or glasses, wash all dishes in hot soapy water. Throw out your toothbrush in three days to avoid recontamination. Hydrate with hot or cold drinks for comfort, wash hands or use hand hygiene often. Cover your cough or sneeze. Follow up in two weeks for recheck if not 100% improved; seek immediate care if shortness of breath, inability to swallow saliva or breathe.

## 2023-10-05 ENCOUNTER — OFFICE VISIT (OUTPATIENT)
Dept: FAMILY MEDICINE CLINIC | Facility: CLINIC | Age: 28
End: 2023-10-05
Payer: COMMERCIAL

## 2023-10-05 VITALS
HEIGHT: 69 IN | WEIGHT: 280 LBS | SYSTOLIC BLOOD PRESSURE: 110 MMHG | BODY MASS INDEX: 41.47 KG/M2 | TEMPERATURE: 97 F | OXYGEN SATURATION: 97 % | RESPIRATION RATE: 18 BRPM | HEART RATE: 86 BPM | DIASTOLIC BLOOD PRESSURE: 58 MMHG

## 2023-10-05 DIAGNOSIS — H66.90 ACUTE OTITIS MEDIA, UNSPECIFIED OTITIS MEDIA TYPE: ICD-10-CM

## 2023-10-05 DIAGNOSIS — J02.9 SORE THROAT: Primary | ICD-10-CM

## 2023-10-05 DIAGNOSIS — Z11.52 ENCOUNTER FOR SCREENING FOR COVID-19: ICD-10-CM

## 2023-10-05 PROCEDURE — 3078F DIAST BP <80 MM HG: CPT | Performed by: NURSE PRACTITIONER

## 2023-10-05 PROCEDURE — 3074F SYST BP LT 130 MM HG: CPT | Performed by: NURSE PRACTITIONER

## 2023-10-05 PROCEDURE — 87635 SARS-COV-2 COVID-19 AMP PRB: CPT | Performed by: NURSE PRACTITIONER

## 2023-10-05 PROCEDURE — 3008F BODY MASS INDEX DOCD: CPT | Performed by: NURSE PRACTITIONER

## 2023-10-05 PROCEDURE — 99213 OFFICE O/P EST LOW 20 MIN: CPT | Performed by: NURSE PRACTITIONER

## 2023-10-05 RX ORDER — AZITHROMYCIN 500 MG/1
500 TABLET, FILM COATED ORAL DAILY
Qty: 5 TABLET | Refills: 0 | Status: SHIPPED | OUTPATIENT
Start: 2023-10-05 | End: 2023-10-10

## 2023-10-05 RX ORDER — BUPROPION HYDROCHLORIDE 150 MG/1
150 TABLET ORAL EVERY MORNING
COMMUNITY
Start: 2023-07-18

## 2023-10-05 RX ORDER — MONTELUKAST SODIUM 10 MG/1
10 TABLET ORAL DAILY
COMMUNITY
Start: 2023-07-18

## 2023-10-05 RX ORDER — OMEPRAZOLE 20 MG/1
20 CAPSULE, DELAYED RELEASE ORAL
COMMUNITY
Start: 2023-07-18

## 2023-10-05 RX ORDER — RIZATRIPTAN BENZOATE 10 MG/1
10 TABLET ORAL
COMMUNITY
Start: 2023-07-18

## 2023-10-05 NOTE — PATIENT INSTRUCTIONS
PLAN: Zithromax, take as directed. Finish all the medication even if you feel better. Probiotics or yogurt daily during antibiotic use will help decrease stomach upset and restore good bacteria to the gut. Take for about two weeks. Warm pack to outer ear for comfort. Salt water gargles (1 tsp. Salt in 6 oz lukewarm water), use several times daily to help decrease swelling and pain in throat. Hydrate! (cold or hot based on comfort). Drink lots of water or other non dehydrating liquids to help with illness. Salty foods and soups can help with throat pain and swelling as well. Hand washing-use hand  or wash hands frequently, cover your cough or sneeze, do not share towels or drinks with others. Cepacol lozenges or other throat drops may help soothe as well during the day. May use Tylenol or Ibuprofen over the counter for pain/comfort if needed. Follow up with PCP in 2 weeks if symptoms persist, or sooner if worsening symptoms. Seek immediate care if inability to swallow or breathe.

## 2023-10-06 LAB — SARS-COV-2 RNA RESP QL NAA+PROBE: NOT DETECTED

## 2024-04-11 DIAGNOSIS — J45.40 MODERATE PERSISTENT ASTHMA WITHOUT COMPLICATION (HCC): ICD-10-CM

## 2024-04-11 RX ORDER — ALBUTEROL SULFATE 90 UG/1
AEROSOL, METERED RESPIRATORY (INHALATION)
Qty: 8.5 G | Refills: 0 | OUTPATIENT
Start: 2024-04-11

## 2024-06-20 ENCOUNTER — HOSPITAL ENCOUNTER (EMERGENCY)
Age: 29
Discharge: HOME | End: 2024-06-21
Attending: EMERGENCY MEDICINE
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
## 2024-06-20 DIAGNOSIS — S20.211A RIB CONTUSION, RIGHT, INITIAL ENCOUNTER: ICD-10-CM

## 2024-06-20 DIAGNOSIS — S30.0XXA CONTUSION OF RIGHT BUTTOCK: Primary | ICD-10-CM

## 2024-06-20 PROCEDURE — 96375 TX/PRO/DX INJ NEW DRUG ADDON: CPT

## 2024-06-20 PROCEDURE — 96374 THER/PROPH/DIAG INJ IV PUSH: CPT

## 2024-06-20 PROCEDURE — 99285 EMERGENCY DEPT VISIT HI MDM: CPT

## 2024-06-21 ENCOUNTER — APPOINTMENT (OUTPATIENT)
Dept: CT IMAGING | Age: 29
End: 2024-06-21
Attending: EMERGENCY MEDICINE

## 2024-06-21 ENCOUNTER — APPOINTMENT (OUTPATIENT)
Dept: GENERAL RADIOLOGY | Age: 29
End: 2024-06-21
Attending: EMERGENCY MEDICINE

## 2024-06-21 VITALS
TEMPERATURE: 98 F | SYSTOLIC BLOOD PRESSURE: 111 MMHG | RESPIRATION RATE: 18 BRPM | OXYGEN SATURATION: 98 % | HEIGHT: 65 IN | HEART RATE: 86 BPM | WEIGHT: 293 LBS | BODY MASS INDEX: 48.82 KG/M2 | DIASTOLIC BLOOD PRESSURE: 64 MMHG

## 2024-06-21 LAB
ALBUMIN SERPL-MCNC: 3.9 G/DL (ref 3.4–5)
ALBUMIN/GLOB SERPL: 1.2 {RATIO} (ref 1–2)
ALP LIVER SERPL-CCNC: 48 U/L
ALT SERPL-CCNC: 25 U/L
ANION GAP SERPL CALC-SCNC: 4 MMOL/L (ref 0–18)
AST SERPL-CCNC: 14 U/L (ref 15–37)
B-HCG UR QL: NEGATIVE
BASOPHILS # BLD AUTO: 0.08 X10(3) UL (ref 0–0.2)
BASOPHILS NFR BLD AUTO: 0.7 %
BILIRUB SERPL-MCNC: 0.3 MG/DL (ref 0.1–2)
BUN BLD-MCNC: 18 MG/DL (ref 9–23)
CALCIUM BLD-MCNC: 9.2 MG/DL (ref 8.5–10.1)
CHLORIDE SERPL-SCNC: 104 MMOL/L (ref 98–112)
CO2 SERPL-SCNC: 27 MMOL/L (ref 21–32)
CREAT BLD-MCNC: 1.06 MG/DL
EGFRCR SERPLBLD CKD-EPI 2021: 73 ML/MIN/1.73M2 (ref 60–?)
EOSINOPHIL # BLD AUTO: 0.39 X10(3) UL (ref 0–0.7)
EOSINOPHIL NFR BLD AUTO: 3.2 %
ERYTHROCYTE [DISTWIDTH] IN BLOOD BY AUTOMATED COUNT: 13.2 %
GLOBULIN PLAS-MCNC: 3.2 G/DL (ref 2.8–4.4)
GLUCOSE BLD-MCNC: 121 MG/DL (ref 70–99)
HCT VFR BLD AUTO: 40.5 %
HGB BLD-MCNC: 14 G/DL
IMM GRANULOCYTES # BLD AUTO: 0.03 X10(3) UL (ref 0–1)
IMM GRANULOCYTES NFR BLD: 0.2 %
LYMPHOCYTES # BLD AUTO: 2.59 X10(3) UL (ref 1–4)
LYMPHOCYTES NFR BLD AUTO: 21.2 %
MCH RBC QN AUTO: 27.3 PG (ref 26–34)
MCHC RBC AUTO-ENTMCNC: 34.6 G/DL (ref 31–37)
MCV RBC AUTO: 79.1 FL
MONOCYTES # BLD AUTO: 0.66 X10(3) UL (ref 0.1–1)
MONOCYTES NFR BLD AUTO: 5.4 %
NEUTROPHILS # BLD AUTO: 8.45 X10 (3) UL (ref 1.5–7.7)
NEUTROPHILS # BLD AUTO: 8.45 X10(3) UL (ref 1.5–7.7)
NEUTROPHILS NFR BLD AUTO: 69.3 %
OSMOLALITY SERPL CALC.SUM OF ELEC: 283 MOSM/KG (ref 275–295)
PLATELET # BLD AUTO: 265 10(3)UL (ref 150–450)
POTASSIUM SERPL-SCNC: 4 MMOL/L (ref 3.5–5.1)
PROT SERPL-MCNC: 7.1 G/DL (ref 6.4–8.2)
RBC # BLD AUTO: 5.12 X10(6)UL
SODIUM SERPL-SCNC: 135 MMOL/L (ref 136–145)
WBC # BLD AUTO: 12.2 X10(3) UL (ref 4–11)

## 2024-06-21 PROCEDURE — 73552 X-RAY EXAM OF FEMUR 2/>: CPT | Performed by: EMERGENCY MEDICINE

## 2024-06-21 PROCEDURE — 72131 CT LUMBAR SPINE W/O DYE: CPT | Performed by: EMERGENCY MEDICINE

## 2024-06-21 PROCEDURE — 71260 CT THORAX DX C+: CPT | Performed by: EMERGENCY MEDICINE

## 2024-06-21 PROCEDURE — 81025 URINE PREGNANCY TEST: CPT

## 2024-06-21 PROCEDURE — 80053 COMPREHEN METABOLIC PANEL: CPT | Performed by: EMERGENCY MEDICINE

## 2024-06-21 PROCEDURE — 72128 CT CHEST SPINE W/O DYE: CPT | Performed by: EMERGENCY MEDICINE

## 2024-06-21 PROCEDURE — 74177 CT ABD & PELVIS W/CONTRAST: CPT | Performed by: EMERGENCY MEDICINE

## 2024-06-21 PROCEDURE — 85025 COMPLETE CBC W/AUTO DIFF WBC: CPT | Performed by: EMERGENCY MEDICINE

## 2024-06-21 RX ORDER — MORPHINE SULFATE 4 MG/ML
4 INJECTION, SOLUTION INTRAMUSCULAR; INTRAVENOUS ONCE
Status: COMPLETED | OUTPATIENT
Start: 2024-06-21 | End: 2024-06-21

## 2024-06-21 RX ORDER — NAPROXEN 500 MG/1
500 TABLET ORAL 2 TIMES DAILY PRN
Qty: 20 TABLET | Refills: 0 | Status: SHIPPED | OUTPATIENT
Start: 2024-06-21 | End: 2024-06-28

## 2024-06-21 RX ORDER — ONDANSETRON 2 MG/ML
4 INJECTION INTRAMUSCULAR; INTRAVENOUS ONCE
Status: COMPLETED | OUTPATIENT
Start: 2024-06-21 | End: 2024-06-21

## 2024-06-21 RX ORDER — CYCLOBENZAPRINE HCL 10 MG
10 TABLET ORAL 3 TIMES DAILY PRN
Qty: 20 TABLET | Refills: 0 | Status: SHIPPED | OUTPATIENT
Start: 2024-06-21 | End: 2024-06-28

## 2024-06-21 NOTE — ED INITIAL ASSESSMENT (HPI)
Pt to ED s/p fall, was on top of a 4ft bin and fell on to her back, states did hit her head no LOC. + pain to right lower back radiating to right leg with intermittent tingling. Worse with weightbearing

## 2024-06-21 NOTE — ED PROVIDER NOTES
Patient Seen in: [facility] [place]      History     Chief Complaint   Patient presents with    Fall    Leg or Foot Injury     Stated Complaint: pt to ED s/p fall, fell off a 4ft bin and fell onto right hip. + pain to right *    Subjective:   HPI  Patient is a 29-year-old female presenting to the ED after falling on the stairs.  The patient was standing on a plastic tote approximately 1 feet above the floor when it broke and she fell back landing on the stairs.  She struck her head on the wall but had no loss of consciousness.  No complaints of headache.  No nausea or vomiting.  No weakness or loss of sensation.  She has no chest pain or abdominal pain.  No shortness of breath.  She landed on her right side of her back and buttocks.  She has pain from the right flank region down to the posterior thigh.  This is associated with some numbness and tingling without any true loss of sensation or weakness.  No complaints of bowel or bladder incontinence.  She has been ambulatory since the incident which occurred just prior to arrival.  Her pain is constant, severe at times, worse when she ambulates, no alleviating factors.  No medications prior to arrival.  No complaints of neck pain.      Objective:   Past Medical History:    ALLERGIC RHINITIS    ASTHMA    Asthma (HCC)    Depression    Diabetes (HCC)    Encounter for insertion of mirena IUD    Gestational diabetes (HCC)    iddm    LGSIL of cervix of undetermined significance    Migraines    Moderate episode of recurrent major depressive disorder (HCC)    Pneumonia due to organism    as a child    Prediabetes    Pregnancy-induced hypertension (HCC)    Type 2 diabetes mellitus without complication, without long-term current use of insulin (HCC)              Past Surgical History:   Procedure Laterality Date    Mirena, iud  2016      ,                 Social History     Socioeconomic History    Marital status:    Tobacco Use     Smoking status: Never    Smokeless tobacco: Never   Vaping Use    Vaping status: Never Used   Substance and Sexual Activity    Alcohol use: Not Currently     Comment: occ    Drug use: No    Sexual activity: Yes     Partners: Male     Birth control/protection: I.U.D., Mirena   Other Topics Concern    Caffeine Concern Yes    Stress Concern No    Weight Concern Yes     Comment: Im very overweight    Special Diet No    Exercise No    Seat Belt Yes              Review of Systems    Positive for stated complaint: pt to ED s/p fall, fell off a 4ft bin and fell onto right hip. + pain to right *  Other systems are as noted in HPI.  Constitutional and vital signs reviewed.      All other systems reviewed and negative except as noted above.    Physical Exam     ED Triage Vitals [06/20/24 2212]   /87   Pulse 98   Resp 21   Temp 97.5 °F (36.4 °C)   Temp src Temporal   SpO2 97 %   O2 Device None (Room air)       Current Vitals:   Vital Signs  BP: 111/64  Pulse: 86  Resp: 18  Temp: 97.5 °F (36.4 °C)  Temp src: Temporal    Oxygen Therapy  SpO2: 98 %  O2 Device: None (Room air)            Physical Exam  Vitals and nursing note reviewed.   Constitutional:       General: She is not in acute distress.     Appearance: Normal appearance. She is well-developed. She is obese. She is not ill-appearing or toxic-appearing.      Comments: Standing in the room.   HENT:      Head: Normocephalic and atraumatic.      Right Ear: External ear normal.      Left Ear: External ear normal.      Mouth/Throat:      Mouth: Mucous membranes are moist.      Pharynx: Oropharynx is clear.   Eyes:      Conjunctiva/sclera: Conjunctivae normal.   Neck:      Comments: No midline tenderness or pain elicited with ROM testing  Cardiovascular:      Rate and Rhythm: Normal rate and regular rhythm.      Pulses: Normal pulses.      Heart sounds: Normal heart sounds.   Pulmonary:      Effort: Pulmonary effort is normal.      Breath sounds: Normal breath sounds.    Abdominal:      General: Abdomen is flat. Bowel sounds are normal. There is no distension.      Tenderness: There is no abdominal tenderness.   Musculoskeletal:      Cervical back: Neck supple. No tenderness.      Right lower leg: No edema.      Left lower leg: No edema.      Comments: Midline thoracic tenderness without swelling or deformity at approximately T9.  Midline tenderness at approximately L3-L5 without swelling or deformity.  There is diffuse right-sided thoracolumbar tenderness on examination.  No crepitus.  Contusion abrasion over the right posterior pelvis is noted.  No other signs of external injury on the skin.  Patient is standing in the room.  She has some reproducible tenderness to the posterior thigh.  No swelling or deformity.  She has full range of motion and no tenderness at the knee or patellar region.  Patient does not want to sit or lie down at this time.  Prefers to stand.   Skin:     General: Skin is warm.      Capillary Refill: Capillary refill takes less than 2 seconds.      Findings: No rash.          Neurological:      General: No focal deficit present.      Mental Status: She is alert and oriented to person, place, and time.      Sensory: No sensory deficit.      Motor: No weakness.   Psychiatric:         Mood and Affect: Mood normal.         Behavior: Behavior normal.               ED Course     Labs Reviewed   COMP METABOLIC PANEL (14) - Abnormal; Notable for the following components:       Result Value    Glucose 121 (*)     Sodium 135 (*)     Creatinine 1.06 (*)     AST 14 (*)     All other components within normal limits   CBC W/ DIFFERENTIAL - Abnormal; Notable for the following components:    WBC 12.2 (*)     MCV 79.1 (*)     Neutrophil Absolute Prelim 8.45 (*)     Neutrophil Absolute 8.45 (*)     All other components within normal limits   POCT PREGNANCY URINE - Normal   CBC WITH DIFFERENTIAL WITH PLATELET    Narrative:     The following orders were created for panel order  CBC With Differential With Platelet.  Procedure                               Abnormality         Status                     ---------                               -----------         ------                     CBC W/ DIFFERENTIAL[[id number]]          Abnormal            Final result                 Please view results for these tests on the individual orders.                      MDM      History obtained from patient.     Differential diagnosis includes rib fracture, pneumo or hemothorax, solid organ injury such as kidney injury.  Pelvic fracture.  Hip or femur fracture.  Thoracic or lumbar fine fracture.    Previous records reviewed.    Testing considered and ordered includes CT brain was not obtained according to [place] head injury rules.  No midline tenderness on cervical spine examination, normal range of motion, no weakness or loss of sensation, no bowel or bladder incontinence.  CT cervical spine was not ordered, cleared.  CT of the thoracic and lumbar spine were obtained especially given the fact the patient had complaints of paresthesias in her right lower extremity.  CT of the chest, abdomen and pelvis were also ordered to further assess for potential injuries noted in differential diagnosis.  Labs were obtained as CTs were obtained with contrast.  UCG obtained.  X-ray of the right femur also obtained.    I reviewed all results.  CBC reviewed with leukocytosis and WBC of 12.2 and slight neutrophilic shift.  No fever.  Likely reactive given recent fall.  CMP reviewed.  BUN is 18 with creatinine of 1.06 with normal GFR at 73.  Sodium at 135.  Glucose 121.  UCG negative.    I personally reviewed the radiographs and my individual interpretation shows no acute femur fracture  I also reviewed the official report which shows   Right femur 2 views    COMPARISON: None    IMPRESSION:    No acute fracture. No soft tissue abnormality.    CT CHEST WITH CONTRAST  CT ABDOMEN PELVIS WITH CONTRAST  CT THORACIC SPINE  WITHOUT CONTRAST  CT LUMBAR SPINE WITHOUT CONTRAST    COMPARISON: None    IMPRESSION:    CT CHEST:    No traumatic injury in the chest.  No aortic injury or mediastinal hematoma.  No lung contusion or pneumothorax.  No rib fracture.     Mild diffuse bronchial wall thickening concerning for bronchitis.    CT ABDOMEN PELVIS:    No traumatic injury in the abdomen/pelvis.  No liver, splenic, or renal laceration or hematoma.  No free air or free fluid.  No pelvic fracture.    Intrauterine contraceptive device centered in the endometrial cavity.    CT THORACIC SPINE:    No acute fracture.  No traumatic malalignment.    No soft tissue abnormality.    CT LUMBAR SPINE:    No acute fracture.  No traumatic malalignment.    No soft tissue abnormality.      Patient received morphine and Zofran with overall significant improvement in symptoms.  Upon reassessment and discussion of all results, the patient was resting comfortably in bed.  Felt comfortable going home at this time.  Will prescribe naproxen and Flexeril to be used as directed as needed.  Return to ED if symptoms worsen, persist, or new symptoms develop.  Otherwise, outpatient follow-up with primary care provider.  Patient and family comfortable with discharge plan.                                         MDM    Disposition and Plan     Clinical Impression:  1. Contusion of right buttock    2. Fall on stairs, initial encounter    3. Rib contusion, right, initial encounter         Disposition:  Discharge  6/21/2024  3:16 am    Follow-up:  Bobbi Arteaga MD  [address] [place] [id number]  [phone]    Schedule an appointment as soon as possible for a visit in 2 day(s)      [facility] [place]  [address] [place] [id number]  [phone]  Follow up  IF SYMPTOMS WORSEN, PERSIST, OR NEW SYMPTOMS DEVEL          Medications Prescribed:  Discharge Medication List as of 6/21/2024  3:17 AM        START taking these medications     Details   naproxen 500 MG Oral Tab Take 1 tablet (500 mg total) by mouth 2 (two) times daily as needed., Normal, Disp-20 tablet, R-0      cyclobenzaprine 10 MG Oral Tab Take 1 tablet (10 mg total) by mouth 3 (three) times daily as needed for Muscle spasms., Normal, Disp-20 tablet, R-0

## 2024-08-25 ENCOUNTER — HOSPITAL ENCOUNTER (EMERGENCY)
Age: 29
Discharge: HOME | End: 2024-08-25
Attending: EMERGENCY MEDICINE

## 2024-08-25 ENCOUNTER — APPOINTMENT (OUTPATIENT)
Dept: GENERAL RADIOLOGY | Age: 29
End: 2024-08-25
Attending: NURSE PRACTITIONER

## 2024-08-25 VITALS
HEART RATE: 92 BPM | HEIGHT: 65 IN | OXYGEN SATURATION: 98 % | DIASTOLIC BLOOD PRESSURE: 63 MMHG | BODY MASS INDEX: 48.82 KG/M2 | RESPIRATION RATE: 18 BRPM | SYSTOLIC BLOOD PRESSURE: 124 MMHG | TEMPERATURE: 98 F | WEIGHT: 293 LBS

## 2024-08-25 DIAGNOSIS — S93.401A MODERATE RIGHT ANKLE SPRAIN, INITIAL ENCOUNTER: Primary | ICD-10-CM

## 2024-08-25 PROCEDURE — 99284 EMERGENCY DEPT VISIT MOD MDM: CPT

## 2024-08-25 PROCEDURE — 99283 EMERGENCY DEPT VISIT LOW MDM: CPT

## 2024-08-25 PROCEDURE — 73610 X-RAY EXAM OF ANKLE: CPT | Performed by: EMERGENCY MEDICINE

## 2024-08-25 NOTE — ED PROVIDER NOTES
I reviewed that chart and discussed the case.  I have examined the patient and noted mild tenderness right ankle.  Mild tenderness medial anterior lateral aspect.  Swelling present.  Given to dorsiflex plantarflex.  Foot nontender no tenderness base of the fifth or cuboid.  Good dorsalis pedis pulse sensation tact light touch.      Right ankle x-ray1. There is no acute fracture.   2. There is enthesophyte formation dorsal aspect of calcaneus.   3. There is diffuse soft tissue swelling.      Independently reviewed by myself, no fracture    Recommend ice, elevate, ibuprofen.  Weightbearing as tolerated.  Follow-up for further evaluation.  Return if new or worse symptoms.  Did consider ankle sprain, ankle fracture.     I did the substantive portion of the medical decision making.     I agree with the following clinical impression(s):  No diagnosis found..     I agree with the plan as noted.

## 2024-08-25 NOTE — ED PROVIDER NOTES
Patient Seen in: [facility] In [place]      History     Chief Complaint   Patient presents with    Ankle Injury     Stated Complaint: right ankle injury yesterday    Subjective:   HPI    Patient is a pleasant 29-year-old female with diabetes and obesity here for evaluation of right ankle injury.  Injury was sustained yesterday while she was walking on uneven ground in her yard.  Patient states she stepped in a hole, resulting in her rolling her ankle.  She was able to bear weight after injury with compensated gait and \"hobbling.\"  Does have history of right ankle sprain years ago, denies history of fracture or surgery of right ankle or foot.  Took ibuprofen yesterday and applied Ace bandage with mild/temporary relief.  Pain is rated 7 out of 10 in severity.  Denies numbness or tingling distal to right ankle.    Denies right knee or right foot pain.  Pain is reported as generalized along medial, lateral and posterior aspect of right ankle.  Pain is reproducible with flexion, extension, weightbearing, inversion and eversion.    Objective:   Past Medical History:    ALLERGIC RHINITIS    ASTHMA    Asthma (HCC)    Depression    Diabetes (HCC)    Encounter for insertion of Mirena IUD    Gestational diabetes (HCC)    iddm    LGSIL of cervix of undetermined significance    Migraines    Moderate episode of recurrent major depressive disorder (HCC)    Pneumonia due to organism    as a child    Prediabetes    Pregnancy-induced hypertension (HCC)    Type 2 diabetes mellitus without complication, without long-term current use of insulin ([facility])              Past Surgical History:   Procedure Laterality Date    Mirena, iud        ,                 Social History     Socioeconomic History    Marital status:    Tobacco Use    Smoking status: Never    Smokeless tobacco: Never   Vaping Use    Vaping status: Never Used   Substance and Sexual Activity    Alcohol use: Not Currently     Comment:  occ    Drug use: No    Sexual activity: Yes     Partners: Male     Birth control/protection: I.U.D., Mirena   Other Topics Concern    Caffeine Concern Yes    Stress Concern No    Weight Concern Yes     Comment: Im very overweight    Special Diet No    Exercise No    Seat Belt Yes              Review of Systems    Positive for stated Chief Complaint: Ankle Injury    Other systems are as noted in HPI.  Constitutional and vital signs reviewed.      All other systems reviewed and negative except as noted above.    Physical Exam     ED Triage Vitals [08/25/24 0822]   /63   Pulse 92   Resp 18   Temp 98.3 °F (36.8 °C)   Temp src Oral   SpO2 98 %   O2 Device        Current Vitals:   Vital Signs  BP: 124/63  Pulse: 92  Resp: 18  Temp: 98.3 °F (36.8 °C)  Temp src: Oral    Oxygen Therapy  SpO2: 98 %            Physical Exam  Vitals and nursing note reviewed.   Constitutional:       General: She is not in acute distress.     Appearance: Normal appearance. She is not ill-appearing, toxic-appearing or diaphoretic.   HENT:      Mouth/Throat:      Mouth: Mucous membranes are moist.   Eyes:      Conjunctiva/sclera: Conjunctivae normal.      Pupils: Pupils are equal, round, and reactive to light.   Cardiovascular:      Rate and Rhythm: Normal rate.      Pulses: Normal pulses.   Pulmonary:      Effort: Pulmonary effort is normal.   Musculoskeletal:      Right lower leg: Normal.      Right ankle: Swelling present. Tenderness present over the lateral malleolus and medial malleolus. No proximal fibula tenderness. Decreased range of motion. Normal pulse.      Right Achilles Tendon: Tenderness present. Esquivel's test negative.      Comments: Moderate medial and lateral right ankle edema.  Pain reproducible with flexion, extension, inversion and eversion.  Decreased range of motion. neurovascular status intact.   Neurological:      Mental Status: She is alert.             ED Course   Labs Reviewed - No data to display  XR ANKLE (MIN  3 VIEWS), RIGHT (CPT=73610)    Result Date: 8/25/2024  CONCLUSION:  1. There is no acute fracture. 2. There is enthesophyte formation dorsal aspect of calcaneus. 3. There is diffuse soft tissue swelling.   LOCATION:  [facility]   Dictated by (CST): Obi Hamm MD on 8/25/2024 at 8:48 AM     Finalized by (CST): Obi Hamm MD on 8/25/2024 at 8:49 AM                 [facility]               Medical Decision Making  Differentials include but are not limited to ankle sprain, fracture and Achilles injury.  Patient declined ibuprofen here today.  X-ray is unremarkable for acute findings.  I do not observe obvious fracture or dislocation.  Plan for supportive treatment including rest, ice, NSAID, Ace and utilize crutches for mobility assistance.  Work note given with weightbearing restrictions.  Close outpatient follow-up with Ortho as needed.  Patient agrees with plan of care.  All questions answered to patient's satisfaction.    Patient presentation and plan of care reviewed and formulated with Dr. Lao, emergency department physician    Amount and/or Complexity of Data Reviewed  Radiology: ordered and independent interpretation performed. Decision-making details documented in ED Course.        Disposition and Plan     Clinical Impression:  1. Moderate right ankle sprain, initial encounter         Disposition:  There is no disposition on file for this visit.  There is no disposition time on file for this visit.    Follow-up:  Emely Padilla PA  [address] [place] [id number]  [phone]    Follow up  As needed          Medications Prescribed:  Current Discharge Medication List

## 2024-10-01 ENCOUNTER — TELEPHONE (OUTPATIENT)
Dept: FAMILY MEDICINE CLINIC | Facility: CLINIC | Age: 29
End: 2024-10-01

## 2024-10-01 NOTE — TELEPHONE ENCOUNTER
From: Neida SARGENT  To: Cheli Gordon  Sent: 10/1/2024 10:13 AM CDT  Subject: appointment    Vadim Gordon    You are due for a physical with HELGA LUKE MD, please call our office at [phone] to schedule an appointment or you can schedule an appointment on [facility].    Thank you    Neida MONTGOMERY

## 2024-10-02 NOTE — ADDENDUM NOTE
Addended by: VIDAL CHAVEZ on: 10/2/2024 10:24 AM     Modules accepted: Orders    
Statement Selected

## 2024-10-07 ENCOUNTER — PATIENT OUTREACH (OUTPATIENT)
Dept: CASE MANAGEMENT | Age: 29
End: 2024-10-07

## 2024-10-07 NOTE — PROCEDURES
The office order for PCP removal request is Approved and finalized on October 7, 2024.    Removed HELGA LUKE MD as the patient's Primary Care Physician

## 2025-01-21 ENCOUNTER — APPOINTMENT (OUTPATIENT)
Dept: GENERAL RADIOLOGY | Age: 30
End: 2025-01-21
Attending: EMERGENCY MEDICINE
Payer: COMMERCIAL

## 2025-01-21 ENCOUNTER — HOSPITAL ENCOUNTER (EMERGENCY)
Age: 30
Discharge: HOME | End: 2025-01-21
Attending: EMERGENCY MEDICINE
Payer: COMMERCIAL

## 2025-01-21 VITALS
HEART RATE: 74 BPM | SYSTOLIC BLOOD PRESSURE: 128 MMHG | DIASTOLIC BLOOD PRESSURE: 85 MMHG | BODY MASS INDEX: 48.82 KG/M2 | TEMPERATURE: 98 F | HEIGHT: 65 IN | RESPIRATION RATE: 16 BRPM | WEIGHT: 293 LBS | OXYGEN SATURATION: 99 %

## 2025-01-21 DIAGNOSIS — F41.9 ANXIETY: ICD-10-CM

## 2025-01-21 DIAGNOSIS — R06.00 DYSPNEA, UNSPECIFIED TYPE: Primary | ICD-10-CM

## 2025-01-21 LAB
ALBUMIN SERPL-MCNC: 4.5 G/DL (ref 3.2–4.8)
ALBUMIN/GLOB SERPL: 2 {RATIO} (ref 1–2)
ALP LIVER SERPL-CCNC: 41 U/L
ALT SERPL-CCNC: 21 U/L
ANION GAP SERPL CALC-SCNC: 6 MMOL/L (ref 0–18)
AST SERPL-CCNC: 14 U/L (ref ?–34)
ATRIAL RATE: 88 BPM
BASOPHILS # BLD AUTO: 0.05 X10(3) UL (ref 0–0.2)
BASOPHILS NFR BLD AUTO: 0.6 %
BILIRUB SERPL-MCNC: 0.3 MG/DL (ref 0.3–1.2)
BUN BLD-MCNC: 12 MG/DL (ref 9–23)
CALCIUM BLD-MCNC: 9.8 MG/DL (ref 8.7–10.6)
CHLORIDE SERPL-SCNC: 108 MMOL/L (ref 98–112)
CO2 SERPL-SCNC: 26 MMOL/L (ref 21–32)
CREAT BLD-MCNC: 0.87 MG/DL
D DIMER PPP FEU-MCNC: 0.29 UG/ML FEU (ref ?–0.5)
EGFRCR SERPLBLD CKD-EPI 2021: 92 ML/MIN/1.73M2 (ref 60–?)
EOSINOPHIL # BLD AUTO: 0.63 X10(3) UL (ref 0–0.7)
EOSINOPHIL NFR BLD AUTO: 8.2 %
ERYTHROCYTE [DISTWIDTH] IN BLOOD BY AUTOMATED COUNT: 13.2 %
GLOBULIN PLAS-MCNC: 2.3 G/DL (ref 2–3.5)
GLUCOSE BLD-MCNC: 123 MG/DL (ref 70–99)
HCG SERPL QL: NEGATIVE
HCT VFR BLD AUTO: 39.4 %
HGB BLD-MCNC: 13.8 G/DL
IMM GRANULOCYTES # BLD AUTO: 0.01 X10(3) UL (ref 0–1)
IMM GRANULOCYTES NFR BLD: 0.1 %
LYMPHOCYTES # BLD AUTO: 2.29 X10(3) UL (ref 1–4)
LYMPHOCYTES NFR BLD AUTO: 29.6 %
MCH RBC QN AUTO: 28 PG (ref 26–34)
MCHC RBC AUTO-ENTMCNC: 35 G/DL (ref 31–37)
MCV RBC AUTO: 79.9 FL
MONOCYTES # BLD AUTO: 0.52 X10(3) UL (ref 0.1–1)
MONOCYTES NFR BLD AUTO: 6.7 %
NEUTROPHILS # BLD AUTO: 4.23 X10 (3) UL (ref 1.5–7.7)
NEUTROPHILS # BLD AUTO: 4.23 X10(3) UL (ref 1.5–7.7)
NEUTROPHILS NFR BLD AUTO: 54.8 %
OSMOLALITY SERPL CALC.SUM OF ELEC: 291 MOSM/KG (ref 275–295)
P AXIS: 34 DEGREES
P-R INTERVAL: 170 MS
PLATELET # BLD AUTO: 232 10(3)UL (ref 150–450)
POTASSIUM SERPL-SCNC: 3.8 MMOL/L (ref 3.5–5.1)
PROT SERPL-MCNC: 6.8 G/DL (ref 5.7–8.2)
Q-T INTERVAL: 362 MS
QRS DURATION: 86 MS
QTC CALCULATION (BEZET): 438 MS
R AXIS: -9 DEGREES
RBC # BLD AUTO: 4.93 X10(6)UL
SODIUM SERPL-SCNC: 140 MMOL/L (ref 136–145)
T AXIS: 3 DEGREES
TROPONIN I SERPL HS-MCNC: <3 NG/L
VENTRICULAR RATE: 88 BPM
WBC # BLD AUTO: 7.7 X10(3) UL (ref 4–11)

## 2025-01-21 PROCEDURE — 93010 ELECTROCARDIOGRAM REPORT: CPT

## 2025-01-21 PROCEDURE — 93005 ELECTROCARDIOGRAM TRACING: CPT

## 2025-01-21 PROCEDURE — 99285 EMERGENCY DEPT VISIT HI MDM: CPT

## 2025-01-21 PROCEDURE — 84703 CHORIONIC GONADOTROPIN ASSAY: CPT | Performed by: EMERGENCY MEDICINE

## 2025-01-21 PROCEDURE — 80053 COMPREHEN METABOLIC PANEL: CPT | Performed by: EMERGENCY MEDICINE

## 2025-01-21 PROCEDURE — 99284 EMERGENCY DEPT VISIT MOD MDM: CPT

## 2025-01-21 PROCEDURE — 71045 X-RAY EXAM CHEST 1 VIEW: CPT | Performed by: EMERGENCY MEDICINE

## 2025-01-21 PROCEDURE — 84484 ASSAY OF TROPONIN QUANT: CPT | Performed by: EMERGENCY MEDICINE

## 2025-01-21 PROCEDURE — 96374 THER/PROPH/DIAG INJ IV PUSH: CPT

## 2025-01-21 PROCEDURE — 85379 FIBRIN DEGRADATION QUANT: CPT | Performed by: EMERGENCY MEDICINE

## 2025-01-21 PROCEDURE — 85025 COMPLETE CBC W/AUTO DIFF WBC: CPT | Performed by: EMERGENCY MEDICINE

## 2025-01-21 RX ORDER — BUDESONIDE, GLYCOPYRROLATE, AND FORMOTEROL FUMARATE 160; 9; 4.8 UG/1; UG/1; UG/1
2 AEROSOL, METERED RESPIRATORY (INHALATION) 2 TIMES DAILY
COMMUNITY

## 2025-01-21 RX ORDER — LORAZEPAM 2 MG/ML
0.5 INJECTION INTRAMUSCULAR ONCE
Status: COMPLETED | OUTPATIENT
Start: 2025-01-21 | End: 2025-01-21

## 2025-01-21 RX ORDER — LORAZEPAM 0.5 MG/1
0.5 TABLET ORAL 2 TIMES DAILY PRN
Qty: 6 TABLET | Refills: 0 | Status: SHIPPED | OUTPATIENT
Start: 2025-01-21 | End: 2025-01-24

## 2025-01-21 NOTE — DISCHARGE INSTRUCTIONS
Follow up with your primary in the next 2-3 days for reassessment. Take the medication as prescribed. The ativan can make you drowsy, so be careful when taking it. Return for any increased work of breathing, severe pain or any other concerning symptoms.

## 2025-01-21 NOTE — ED INITIAL ASSESSMENT (HPI)
Patient to ED for c/o shortness of breath since 2am today. Hx asthma, took a nebulizer and inhaler prior to arrival without relief. Lungs clear on arrival.

## 2025-01-21 NOTE — ED QUICK NOTES
Patient hyperventilating upon this RN's entrance into the room. Patient coached on slow breathing and reassured she is in good hands. Patient was able to slow her breathing and relax prior to this RN leaving the room. Patient now ambulatory to the bathroom with a steady agit.

## 2025-01-21 NOTE — ED PROVIDER NOTES
Patient Seen in: [facility] In [place]      History     Chief Complaint   Patient presents with    Difficulty Breathing     Stated Complaint: esme    Subjective:   HPI      29-year-old female with past medical history of diabetes presents today for evaluation.  Patient felt well throughout the day prior to bed.  She woke around 2 AM with the chest pain in her central chest that radiates bilaterally.  She also reports feeling short of breath as well.  She denies any fevers, cough, leg swelling, recent travel/surgery/prolonged immobilization, tobacco use or personal history of hypertension or hyperlipidemia.  She denies any early family history of CVA/CAD.    Objective:     Past Medical History:    ALLERGIC RHINITIS    ASTHMA    Asthma (HCC)    Depression    Diabetes (HCC)    Encounter for insertion of Mirena IUD    Gestational diabetes (HCC)    iddm    LGSIL of cervix of undetermined significance    Migraines    Moderate episode of recurrent major depressive disorder (HCC)    Pneumonia due to organism    as a child    Prediabetes    Pregnancy-induced hypertension (HCC)    Type 2 diabetes mellitus without complication, without long-term current use of insulin (HCC)              Past Surgical History:   Procedure Laterality Date    Mirena, iud        ,                 Social History     Socioeconomic History    Marital status:    Tobacco Use    Smoking status: Never    Smokeless tobacco: Never   Vaping Use    Vaping status: Never Used   Substance and Sexual Activity    Alcohol use: Not Currently     Comment: occ    Drug use: No    Sexual activity: Yes     Partners: Male     Birth control/protection: I.U.D., Mirena   Other Topics Concern    Caffeine Concern Yes    Stress Concern No    Weight Concern Yes     Comment: Im very overweight    Special Diet No    Exercise No    Seat Belt Yes                  Physical Exam     ED Triage Vitals [25 0354]   BP (!) 147/108   Pulse  89   Resp 22   Temp 97.6 °F (36.4 °C)   Temp src    SpO2 97 %   O2 Device None (Room air)       Current Vitals:   Vital Signs  BP: 128/85  Pulse: 74  Resp: 16  Temp: 97.6 °F (36.4 °C)    Oxygen Therapy  SpO2: 99 %  O2 Device: None (Room air)        Physical Exam  Nursing note reviewed.   Constitutional:       Appearance: Normal appearance.   HENT:      Head: Normocephalic.      Nose: Nose normal.      Mouth/Throat:      Mouth: Mucous membranes are moist.   Eyes:      Extraocular Movements: Extraocular movements intact.   Cardiovascular:      Rate and Rhythm: Normal rate and regular rhythm.   Pulmonary:      Effort: Pulmonary effort is normal.      Breath sounds: Normal breath sounds.   Abdominal:      General: Abdomen is flat.   Musculoskeletal:         General: Normal range of motion.   Skin:     General: Skin is warm.   Neurological:      General: No focal deficit present.      Mental Status: She is alert.   Psychiatric:         Mood and Affect: Mood normal.         ED Course     Labs Reviewed   CBC WITH DIFFERENTIAL WITH PLATELET - Abnormal; Notable for the following components:       Result Value    MCV 79.9 (*)     All other components within normal limits   COMP METABOLIC PANEL (14) - Abnormal; Notable for the following components:    Glucose 123 (*)     All other components within normal limits   TROPONIN I HIGH SENSITIVITY - Normal   D-DIMER - Normal   HCG, BETA SUBUNIT, QUAL - Normal     EKG    Rate, intervals and axes as noted on EKG Report.  Rate: 88  Rhythm: Sinus Rhythm  Reading: no stemi              AP VIEW OF THE CHEST    IMPRESSION    COMPARISONS: None    Low lung volumes  Lungs are clear.  Cardiomediastinal silhouette is unremarkable.  No pneumothorax or pleural effusion.  No acute bony abnormality.       MDM      Differential Diagnosis  29-year-old female presents today for evaluation of chest tightness and shortness of breath.  She is satting well on room air and her work of breathing is  nonlabored.  Differential include myocardial ischemia, pulmonary embolism, pneumonia, pulmonary edema, anemia.  Plan for chest x-ray along with labs, will reassess.    5:29 am  Patient's ED workup is reassuring.  Her chest x-ray is clear.  Troponin and D-dimer are normal.  Labs not demonstrate anemia, KAYLA or any electrolye abnormality.  On reassessment, patient's vital signs are reassuring.  Her work of breathing remains nonlabored.  I reviewed all the positive results with patient.  Patient has not been compliant with her previously prescribed medications.  She mentions not getting samples from her doctor's office as the reason.  It appears she had been prescribed buproprion and ecitalopram in the past.  Her primary care note from December 4, 2023 mentions a history of depression.  It states she was feeling more anxious with bupropion.  We discussed the possibility of her symptoms having a component of anxiety.  Patient would like to try something for anxiety.  Will give Ativan and reassess.  Patient drove however states she can secure a ride home.    6:45 am  On reassessment, patient states she feels better.  With her reassuring workup and clinical improvement, I feel she stable for discharge home.  I advised outpatient PCP follow-up for reassessment and return for any worsening symptoms.    Patient reported improvement to her symptoms following Ativan.  She request that I discharged her with a prescription.  Will DC with a short course at her request.  I explained that the recommended treatment for anxiety is antidepressants and therapy, she will discuss this with her primary care doctor.    Independent Interpretation  I indpedently interpreted the chest x-ray, no consolidation noted        Medical Decision Making      Disposition and Plan     Clinical Impression:  1. Dyspnea, unspecified type    2. Anxiety         Disposition:  Discharge  1/21/2025  6:48 am    Follow-up:  Nonstaff, Physician    Call in 1  day(s)            Medications Prescribed:  Current Discharge Medication List        START taking these medications    Details   LORazepam 0.5 MG Oral Tab Take 1 tablet (0.5 mg total) by mouth 2 (two) times daily as needed for Anxiety.  Qty: 6 tablet, Refills: 0    Associated Diagnoses: Anxiety                 Supplementary Documentation:

## 2025-01-21 NOTE — ED QUICK NOTES
Patient updated on results and is concerned that there is \"something going on that we're missing.\" ED MD spoke with patient and placed orders for anxiety. Patient to be medicated per MAR.

## 2025-03-04 ENCOUNTER — HOSPITAL ENCOUNTER (EMERGENCY)
Age: 30
Discharge: HOME | End: 2025-03-04
Payer: COMMERCIAL

## 2025-03-04 VITALS
HEART RATE: 94 BPM | SYSTOLIC BLOOD PRESSURE: 122 MMHG | RESPIRATION RATE: 16 BRPM | BODY MASS INDEX: 48.82 KG/M2 | TEMPERATURE: 97 F | WEIGHT: 293 LBS | DIASTOLIC BLOOD PRESSURE: 77 MMHG | OXYGEN SATURATION: 96 % | HEIGHT: 65 IN

## 2025-03-04 DIAGNOSIS — S89.90XA INJURY OF CALF: Primary | ICD-10-CM

## 2025-03-04 LAB — B-HCG UR QL: NEGATIVE

## 2025-03-04 PROCEDURE — 99283 EMERGENCY DEPT VISIT LOW MDM: CPT

## 2025-03-04 PROCEDURE — 81025 URINE PREGNANCY TEST: CPT

## 2025-03-04 PROCEDURE — 99284 EMERGENCY DEPT VISIT MOD MDM: CPT

## 2025-03-04 PROCEDURE — 96372 THER/PROPH/DIAG INJ SC/IM: CPT

## 2025-03-04 RX ORDER — PRAZOSIN HYDROCHLORIDE 1 MG/1
1 CAPSULE ORAL NIGHTLY
COMMUNITY
Start: 2025-02-10

## 2025-03-04 RX ORDER — KETOROLAC TROMETHAMINE 30 MG/ML
60 INJECTION, SOLUTION INTRAMUSCULAR; INTRAVENOUS ONCE
Status: COMPLETED | OUTPATIENT
Start: 2025-03-04 | End: 2025-03-04

## 2025-03-04 RX ORDER — HYDROCODONE BITARTRATE AND ACETAMINOPHEN 5; 325 MG/1; MG/1
1 TABLET ORAL ONCE
Status: COMPLETED | OUTPATIENT
Start: 2025-03-04 | End: 2025-03-04

## 2025-03-04 RX ORDER — FLUOXETINE HYDROCHLORIDE 40 MG/1
40 CAPSULE ORAL DAILY
COMMUNITY
Start: 2025-02-10 | End: 2026-02-05

## 2025-03-04 RX ORDER — KETOROLAC TROMETHAMINE 10 MG/1
10 TABLET, FILM COATED ORAL 3 TIMES DAILY
Qty: 12 TABLET | Refills: 0 | Status: SHIPPED | OUTPATIENT
Start: 2025-03-04 | End: 2025-03-08

## 2025-03-05 ENCOUNTER — TELEPHONE (OUTPATIENT)
Dept: ORTHOPEDICS CLINIC | Facility: CLINIC | Age: 30
End: 2025-03-05

## 2025-03-05 NOTE — DISCHARGE INSTRUCTIONS
Ace wrap during the day, remove at night.  Elevate and ice.  Toradol with food 3 times daily.  May take additional Tylenol if needed.  Orthopedic follow-up

## 2025-03-05 NOTE — ED PROVIDER NOTES
Patient Seen in: [facility] In [place]      History     Chief Complaint   Patient presents with    Leg or Foot Injury     Stated Complaint: left calf pain, felt a pop while walking    Subjective:   HPI      29-year-old female.  Just prior to arrival the patient was ambulating down her stairs at home when she suddenly felt a pop to her left posterior calf.  She immediately had sharp pain and swelling to the region.  She now has moderate pain with attempted weightbearing.      Objective:     Past Medical History:    ALLERGIC RHINITIS    ASTHMA    Asthma (HCC)    Depression    Diabetes (HCC)    Encounter for insertion of Mirena IUD    Gestational diabetes (HCC)    iddm    LGSIL of cervix of undetermined significance    Migraines    Moderate episode of recurrent major depressive disorder (HCC)    Pneumonia due to organism    as a child    Prediabetes    Pregnancy-induced hypertension (HCC)    Type 2 diabetes mellitus without complication, without long-term current use of insulin (HCC)              Past Surgical History:   Procedure Laterality Date    Mirena, iud        ,                 Social History     Socioeconomic History    Marital status:    Tobacco Use    Smoking status: Never    Smokeless tobacco: Never   Vaping Use    Vaping status: Never Used   Substance and Sexual Activity    Alcohol use: Not Currently     Comment: occ    Drug use: No    Sexual activity: Yes     Partners: Male     Birth control/protection: I.U.D., Mirena   Other Topics Concern    Caffeine Concern Yes    Stress Concern No    Weight Concern Yes     Comment: Im very overweight    Special Diet No    Exercise No    Seat Belt Yes                  Physical Exam     ED Triage Vitals [25 1906]   /77   Pulse 94   Resp 16   Temp 97.3 °F (36.3 °C)   Temp src Oral   SpO2 96 %   O2 Device None (Room air)       Current Vitals:   Vital Signs  BP: 122/77  Pulse: 94  Resp: 16  Temp: 97.3 °F (36.3  °C)  Temp src: Oral    Oxygen Therapy  SpO2: 96 %  O2 Device: None (Room air)        Physical Exam     Gen: Well appearing, well groomed, alert and aware x 3  Neck: Supple, full range of motion  Eye examination: EOMs are intact, normal conjunctival  ENT: Atraumatic  Lung: No distress, RR, no retraction  Extremities: Patient acutely tender to the proximal mid aspect of the left calf.  There is swelling without ecchymosis.  No abnormal visual muscle bunching.  She remains neurovascularly intact with a strong precipitous pulse and normal cap refill.  Back: Full range of motion  Skin: No sign of trauma, Skin warm and dry, no induration or sign of infection.   Neuro: Cranial nerves intact (taste and smell omited), Normal Gait.Extremity strength is 5/5 and equal bilaterally. Sensation is equal bilaterally.  ED Course     Labs Reviewed   POCT PREGNANCY URINE - Normal                 MDM        Patient is acutely tender to the proximal aspect of her left calf.  Anatomical location suggesting injury to the plantaris.  There is no ecchymosis.  There is swelling.  More suggestive of a possible tendon injury versus strain.  Most likely muscle tear.    Distal extremities neurovascularly intact.  Compartment is soft.    Point-of-care pregnancy.  Patient given Toradol and [place].  Ace wrap applied.  Crutch training.  Will refer to orthopedics    Ace wrap during the day, remove at night.  Elevate and ice.  Toradol with food 3 times daily.  May take additional Tylenol if needed.  Orthopedic follow-up    Medical Decision Making      Disposition and Plan     Clinical Impression:  1. Injury of calf         Disposition:  Discharge  3/4/2025  8:22 pm    Follow-up:  Emely Padilla PA  [address] [place] [address] [id number] [phone]    Follow up      Brent Reeves MD  [address] [address] [id number] [phone]    Follow up            Medications Prescribed:  Current Discharge Medication List         START taking these medications    Details   Ketorolac Tromethamine 10 MG Oral Tab Take 1 tablet (10 mg total) by mouth in the morning, at noon, and at bedtime for 4 days.  Qty: 12 tablet, Refills: 0                 Supplementary Documentation:

## 2025-03-05 NOTE — ED INITIAL ASSESSMENT (HPI)
Pt to ED with left calf pain with weightbearing, pt was going down the stairs and heard a \"pop\".

## 2025-08-10 ENCOUNTER — APPOINTMENT (OUTPATIENT)
Dept: CT IMAGING | Age: 30
End: 2025-08-10
Attending: PHYSICIAN ASSISTANT

## 2025-08-10 ENCOUNTER — HOSPITAL ENCOUNTER (INPATIENT)
Facility: HOSPITAL | Age: 30
LOS: 6 days | Discharge: HOME | End: 2025-08-17
Attending: EMERGENCY MEDICINE | Admitting: INTERNAL MEDICINE

## 2025-08-10 ENCOUNTER — APPOINTMENT (OUTPATIENT)
Dept: GENERAL RADIOLOGY | Age: 30
End: 2025-08-10

## 2025-08-10 DIAGNOSIS — R09.1 PLEURISY: ICD-10-CM

## 2025-08-10 DIAGNOSIS — J18.9 PNEUMONIA OF RIGHT LOWER LOBE DUE TO INFECTIOUS ORGANISM: Primary | ICD-10-CM

## 2025-08-10 LAB
ALBUMIN SERPL-MCNC: 4.3 G/DL (ref 3.2–4.8)
ALBUMIN/GLOB SERPL: 1.7 (ref 1–2)
ALP LIVER SERPL-CCNC: 49 U/L (ref 37–98)
ALT SERPL-CCNC: 11 U/L (ref 10–49)
ANION GAP SERPL CALC-SCNC: 12 MMOL/L (ref 0–18)
AST SERPL-CCNC: 10 U/L (ref ?–34)
BASOPHILS # BLD AUTO: 0.04 X10(3) UL (ref 0–0.2)
BASOPHILS NFR BLD AUTO: 0.3 %
BILIRUB SERPL-MCNC: 0.4 MG/DL (ref 0.3–1.2)
BILIRUB UR QL STRIP.AUTO: NEGATIVE
BUN BLD-MCNC: 14 MG/DL (ref 9–23)
CALCIUM BLD-MCNC: 9.2 MG/DL (ref 8.7–10.6)
CHLORIDE SERPL-SCNC: 103 MMOL/L (ref 98–112)
CLARITY UR REFRACT.AUTO: CLEAR
CO2 SERPL-SCNC: 24 MMOL/L (ref 21–32)
COLOR UR AUTO: YELLOW
CREAT BLD-MCNC: 1.01 MG/DL (ref 0.55–1.02)
D DIMER PPP FEU-MCNC: 1.81 UG/ML FEU (ref ?–0.5)
EGFRCR SERPLBLD CKD-EPI 2021: 77 ML/MIN/1.73M2 (ref 60–?)
EOSINOPHIL # BLD AUTO: 0.05 X10(3) UL (ref 0–0.7)
EOSINOPHIL NFR BLD AUTO: 0.3 %
ERYTHROCYTE [DISTWIDTH] IN BLOOD BY AUTOMATED COUNT: 12.4 %
GLOBULIN PLAS-MCNC: 2.6 G/DL (ref 2–3.5)
GLUCOSE BLD-MCNC: 125 MG/DL (ref 70–99)
GLUCOSE UR STRIP.AUTO-MCNC: NEGATIVE MG/DL
HCG SERPL QL: NEGATIVE
HCT VFR BLD AUTO: 35.8 % (ref 35–48)
HGB BLD-MCNC: 12.2 G/DL (ref 12–16)
IMM GRANULOCYTES # BLD AUTO: 0.07 X10(3) UL (ref 0–1)
IMM GRANULOCYTES NFR BLD: 0.5 %
LEUKOCYTE ESTERASE UR QL STRIP.AUTO: NEGATIVE
LIPASE SERPL-CCNC: 37 U/L (ref 12–53)
LYMPHOCYTES # BLD AUTO: 2.29 X10(3) UL (ref 1–4)
LYMPHOCYTES NFR BLD AUTO: 15.1 %
MCH RBC QN AUTO: 27.5 PG (ref 26–34)
MCHC RBC AUTO-ENTMCNC: 34.1 G/DL (ref 31–37)
MCV RBC AUTO: 80.6 FL (ref 80–100)
MONOCYTES # BLD AUTO: 1.07 X10(3) UL (ref 0.1–1)
MONOCYTES NFR BLD AUTO: 7 %
NEUTROPHILS # BLD AUTO: 11.66 X10 (3) UL (ref 1.5–7.7)
NEUTROPHILS # BLD AUTO: 11.66 X10(3) UL (ref 1.5–7.7)
NEUTROPHILS NFR BLD AUTO: 76.8 %
NITRITE UR QL STRIP.AUTO: NEGATIVE
OSMOLALITY SERPL CALC.SUM OF ELEC: 290 MOSM/KG (ref 275–295)
PH UR STRIP.AUTO: 5.5 (ref 5–8)
PLATELET # BLD AUTO: 349 10(3)UL (ref 150–450)
POTASSIUM SERPL-SCNC: 3.2 MMOL/L (ref 3.5–5.1)
PROT SERPL-MCNC: 6.9 G/DL (ref 5.7–8.2)
PROT UR STRIP.AUTO-MCNC: NEGATIVE MG/DL
RBC # BLD AUTO: 4.44 X10(6)UL (ref 3.8–5.3)
RBC UR QL AUTO: NEGATIVE
SODIUM SERPL-SCNC: 139 MMOL/L (ref 136–145)
SP GR UR STRIP.AUTO: 1.01 (ref 1–1.03)
TROPONIN I SERPL HS-MCNC: <3 NG/L (ref ?–34)
UROBILINOGEN UR STRIP.AUTO-MCNC: 0.2 MG/DL
WBC # BLD AUTO: 15.2 X10(3) UL (ref 4–11)

## 2025-08-10 PROCEDURE — 80053 COMPREHEN METABOLIC PANEL: CPT | Performed by: PHYSICIAN ASSISTANT

## 2025-08-10 PROCEDURE — 96375 TX/PRO/DX INJ NEW DRUG ADDON: CPT

## 2025-08-10 PROCEDURE — 84703 CHORIONIC GONADOTROPIN ASSAY: CPT

## 2025-08-10 PROCEDURE — 71046 X-RAY EXAM CHEST 2 VIEWS: CPT

## 2025-08-10 PROCEDURE — 81003 URINALYSIS AUTO W/O SCOPE: CPT | Performed by: PHYSICIAN ASSISTANT

## 2025-08-10 PROCEDURE — 87040 BLOOD CULTURE FOR BACTERIA: CPT | Performed by: EMERGENCY MEDICINE

## 2025-08-10 PROCEDURE — 99285 EMERGENCY DEPT VISIT HI MDM: CPT

## 2025-08-10 PROCEDURE — 83690 ASSAY OF LIPASE: CPT | Performed by: EMERGENCY MEDICINE

## 2025-08-10 PROCEDURE — 85025 COMPLETE CBC W/AUTO DIFF WBC: CPT | Performed by: PHYSICIAN ASSISTANT

## 2025-08-10 PROCEDURE — 93005 ELECTROCARDIOGRAM TRACING: CPT

## 2025-08-10 PROCEDURE — 84703 CHORIONIC GONADOTROPIN ASSAY: CPT | Performed by: EMERGENCY MEDICINE

## 2025-08-10 PROCEDURE — 96361 HYDRATE IV INFUSION ADD-ON: CPT

## 2025-08-10 PROCEDURE — 93010 ELECTROCARDIOGRAM REPORT: CPT

## 2025-08-10 PROCEDURE — 84484 ASSAY OF TROPONIN QUANT: CPT | Performed by: EMERGENCY MEDICINE

## 2025-08-10 PROCEDURE — 74177 CT ABD & PELVIS W/CONTRAST: CPT | Performed by: EMERGENCY MEDICINE

## 2025-08-10 PROCEDURE — 71275 CT ANGIOGRAPHY CHEST: CPT | Performed by: EMERGENCY MEDICINE

## 2025-08-10 PROCEDURE — 85379 FIBRIN DEGRADATION QUANT: CPT | Performed by: PHYSICIAN ASSISTANT

## 2025-08-10 PROCEDURE — 36415 COLL VENOUS BLD VENIPUNCTURE: CPT

## 2025-08-10 RX ORDER — DOXYCYCLINE 100 MG/1
100 CAPSULE ORAL 2 TIMES DAILY
COMMUNITY

## 2025-08-10 RX ORDER — ONDANSETRON 2 MG/ML
4 INJECTION INTRAMUSCULAR; INTRAVENOUS ONCE
Status: COMPLETED | OUTPATIENT
Start: 2025-08-10 | End: 2025-08-10

## 2025-08-10 RX ORDER — POTASSIUM CHLORIDE 1500 MG/1
20 TABLET, EXTENDED RELEASE ORAL ONCE
Status: COMPLETED | OUTPATIENT
Start: 2025-08-10 | End: 2025-08-11

## 2025-08-10 RX ORDER — HYDROMORPHONE HYDROCHLORIDE 1 MG/ML
0.5 INJECTION, SOLUTION INTRAMUSCULAR; INTRAVENOUS; SUBCUTANEOUS EVERY 30 MIN PRN
Refills: 0 | Status: COMPLETED | OUTPATIENT
Start: 2025-08-10 | End: 2025-08-13

## 2025-08-10 RX ORDER — CLONAZEPAM 0.25 MG/1
1-2 TABLET, ORALLY DISINTEGRATING ORAL 2 TIMES DAILY PRN
COMMUNITY
Start: 2025-02-10

## 2025-08-11 PROBLEM — J18.9 PNEUMONIA OF RIGHT LOWER LOBE DUE TO INFECTIOUS ORGANISM: Status: ACTIVE | Noted: 2025-08-11

## 2025-08-11 PROBLEM — R09.1 PLEURISY: Status: ACTIVE | Noted: 2025-08-11

## 2025-08-11 LAB
ANION GAP SERPL CALC-SCNC: 10 MMOL/L (ref 0–18)
ATRIAL RATE: 104 BPM
BUN BLD-MCNC: 10 MG/DL (ref 9–23)
CALCIUM BLD-MCNC: 8.9 MG/DL (ref 8.7–10.6)
CHLORIDE SERPL-SCNC: 105 MMOL/L (ref 98–112)
CO2 SERPL-SCNC: 26 MMOL/L (ref 21–32)
CREAT BLD-MCNC: 1.06 MG/DL (ref 0.55–1.02)
EGFRCR SERPLBLD CKD-EPI 2021: 72 ML/MIN/1.73M2 (ref 60–?)
GLUCOSE BLD-MCNC: 102 MG/DL (ref 70–99)
GLUCOSE BLD-MCNC: 141 MG/DL (ref 70–99)
GLUCOSE BLD-MCNC: 191 MG/DL (ref 70–99)
L PNEUMO AG UR QL: NEGATIVE
LACTATE SERPL-SCNC: 0.9 MMOL/L (ref 0.5–2)
OSMOLALITY SERPL CALC.SUM OF ELEC: 293 MOSM/KG (ref 275–295)
P AXIS: 47 DEGREES
P-R INTERVAL: 156 MS
POTASSIUM SERPL-SCNC: 3.8 MMOL/L (ref 3.5–5.1)
Q-T INTERVAL: 322 MS
QRS DURATION: 86 MS
QTC CALCULATION (BEZET): 423 MS
R AXIS: -8 DEGREES
SODIUM SERPL-SCNC: 141 MMOL/L (ref 136–145)
STREP PNEUMO ANTIGEN, URINE: NEGATIVE
T AXIS: 14 DEGREES
VENTRICULAR RATE: 104 BPM

## 2025-08-11 PROCEDURE — 80048 BASIC METABOLIC PNL TOTAL CA: CPT | Performed by: INTERNAL MEDICINE

## 2025-08-11 PROCEDURE — 87449 NOS EACH ORGANISM AG IA: CPT | Performed by: INTERNAL MEDICINE

## 2025-08-11 PROCEDURE — 82962 GLUCOSE BLOOD TEST: CPT

## 2025-08-11 PROCEDURE — 94640 AIRWAY INHALATION TREATMENT: CPT

## 2025-08-11 PROCEDURE — 87040 BLOOD CULTURE FOR BACTERIA: CPT | Performed by: EMERGENCY MEDICINE

## 2025-08-11 PROCEDURE — 87081 CULTURE SCREEN ONLY: CPT | Performed by: STUDENT IN AN ORGANIZED HEALTH CARE EDUCATION/TRAINING PROGRAM

## 2025-08-11 PROCEDURE — 86698 HISTOPLASMA ANTIBODY: CPT | Performed by: INTERNAL MEDICINE

## 2025-08-11 PROCEDURE — 94760 N-INVAS EAR/PLS OXIMETRY 1: CPT

## 2025-08-11 PROCEDURE — 83605 ASSAY OF LACTIC ACID: CPT | Performed by: EMERGENCY MEDICINE

## 2025-08-11 PROCEDURE — 94664 DEMO&/EVAL PT USE INHALER: CPT

## 2025-08-11 PROCEDURE — 86612 BLASTOMYCES ANTIBODY: CPT | Performed by: INTERNAL MEDICINE

## 2025-08-11 PROCEDURE — 86641 CRYPTOCOCCUS ANTIBODY: CPT | Performed by: INTERNAL MEDICINE

## 2025-08-11 PROCEDURE — 86635 COCCIDIOIDES ANTIBODY: CPT | Performed by: INTERNAL MEDICINE

## 2025-08-11 PROCEDURE — 96365 THER/PROPH/DIAG IV INF INIT: CPT

## 2025-08-11 PROCEDURE — 96375 TX/PRO/DX INJ NEW DRUG ADDON: CPT

## 2025-08-11 PROCEDURE — 86606 ASPERGILLUS ANTIBODY: CPT | Performed by: INTERNAL MEDICINE

## 2025-08-11 RX ORDER — PRAZOSIN HYDROCHLORIDE 1 MG/1
1 CAPSULE ORAL NIGHTLY
Status: DISCONTINUED | OUTPATIENT
Start: 2025-08-11 | End: 2025-08-17

## 2025-08-11 RX ORDER — HYDROMORPHONE HYDROCHLORIDE 1 MG/ML
0.5 INJECTION, SOLUTION INTRAMUSCULAR; INTRAVENOUS; SUBCUTANEOUS EVERY 30 MIN PRN
Refills: 0 | Status: CANCELLED | OUTPATIENT
Start: 2025-08-11 | End: 2025-08-11

## 2025-08-11 RX ORDER — SODIUM PHOSPHATE, DIBASIC AND SODIUM PHOSPHATE, MONOBASIC 7; 19 G/230ML; G/230ML
1 ENEMA RECTAL ONCE AS NEEDED
Status: DISCONTINUED | OUTPATIENT
Start: 2025-08-11 | End: 2025-08-17

## 2025-08-11 RX ORDER — ACETAMINOPHEN 500 MG
1000 TABLET ORAL ONCE
Status: COMPLETED | OUTPATIENT
Start: 2025-08-11 | End: 2025-08-11

## 2025-08-11 RX ORDER — LEVOFLOXACIN 5 MG/ML
500 INJECTION, SOLUTION INTRAVENOUS EVERY 24 HOURS
Status: DISCONTINUED | OUTPATIENT
Start: 2025-08-11 | End: 2025-08-11

## 2025-08-11 RX ORDER — SENNOSIDES 8.6 MG
17.2 TABLET ORAL NIGHTLY PRN
Status: DISCONTINUED | OUTPATIENT
Start: 2025-08-11 | End: 2025-08-17

## 2025-08-11 RX ORDER — ACETAMINOPHEN 500 MG
1000 TABLET ORAL EVERY 6 HOURS PRN
Status: DISCONTINUED | OUTPATIENT
Start: 2025-08-11 | End: 2025-08-17

## 2025-08-11 RX ORDER — FLUTICASONE PROPIONATE AND SALMETEROL 250; 50 UG/1; UG/1
1 POWDER RESPIRATORY (INHALATION) 2 TIMES DAILY
Status: DISCONTINUED | OUTPATIENT
Start: 2025-08-11 | End: 2025-08-17

## 2025-08-11 RX ORDER — CLONAZEPAM 0.25 MG/1
TABLET, ORALLY DISINTEGRATING ORAL 2 TIMES DAILY PRN
Status: DISCONTINUED | OUTPATIENT
Start: 2025-08-11 | End: 2025-08-17

## 2025-08-11 RX ORDER — BISACODYL 10 MG
10 SUPPOSITORY, RECTAL RECTAL
Status: DISCONTINUED | OUTPATIENT
Start: 2025-08-11 | End: 2025-08-17

## 2025-08-11 RX ORDER — RIZATRIPTAN BENZOATE 5 MG/1
5 TABLET, ORALLY DISINTEGRATING ORAL AS NEEDED
Status: DISCONTINUED | OUTPATIENT
Start: 2025-08-11 | End: 2025-08-17

## 2025-08-11 RX ORDER — PHENTERMINE HYDROCHLORIDE 37.5 MG/1
37.5 CAPSULE ORAL EVERY MORNING
Status: DISCONTINUED | OUTPATIENT
Start: 2025-08-11 | End: 2025-08-11 | Stop reason: RX

## 2025-08-11 RX ORDER — MONTELUKAST SODIUM 10 MG/1
10 TABLET ORAL DAILY
Status: DISCONTINUED | OUTPATIENT
Start: 2025-08-11 | End: 2025-08-17

## 2025-08-11 RX ORDER — CODEINE PHOSPHATE AND GUAIFENESIN 10; 100 MG/5ML; MG/5ML
5 SOLUTION ORAL EVERY 4 HOURS PRN
Status: DISCONTINUED | OUTPATIENT
Start: 2025-08-11 | End: 2025-08-17

## 2025-08-11 RX ORDER — TEMAZEPAM 7.5 MG/1
15 CAPSULE ORAL NIGHTLY PRN
Status: DISCONTINUED | OUTPATIENT
Start: 2025-08-11 | End: 2025-08-17

## 2025-08-11 RX ORDER — ONDANSETRON 2 MG/ML
4 INJECTION INTRAMUSCULAR; INTRAVENOUS EVERY 6 HOURS PRN
Status: DISCONTINUED | OUTPATIENT
Start: 2025-08-11 | End: 2025-08-17

## 2025-08-11 RX ORDER — ENOXAPARIN SODIUM 100 MG/ML
40 INJECTION SUBCUTANEOUS EVERY 12 HOURS SCHEDULED
Status: DISCONTINUED | OUTPATIENT
Start: 2025-08-11 | End: 2025-08-17

## 2025-08-11 RX ORDER — SODIUM CHLORIDE, SODIUM LACTATE, POTASSIUM CHLORIDE, CALCIUM CHLORIDE 600; 310; 30; 20 MG/100ML; MG/100ML; MG/100ML; MG/100ML
INJECTION, SOLUTION INTRAVENOUS CONTINUOUS
Status: DISCONTINUED | OUTPATIENT
Start: 2025-08-11 | End: 2025-08-17

## 2025-08-11 RX ORDER — PROCHLORPERAZINE EDISYLATE 5 MG/ML
5 INJECTION INTRAMUSCULAR; INTRAVENOUS EVERY 8 HOURS PRN
Status: DISCONTINUED | OUTPATIENT
Start: 2025-08-11 | End: 2025-08-17

## 2025-08-11 RX ORDER — ALBUTEROL SULFATE 90 UG/1
2 INHALANT RESPIRATORY (INHALATION) EVERY 6 HOURS PRN
Status: DISCONTINUED | OUTPATIENT
Start: 2025-08-11 | End: 2025-08-17

## 2025-08-11 RX ORDER — GUAIFENESIN 600 MG/1
600 TABLET, EXTENDED RELEASE ORAL 2 TIMES DAILY
Status: DISCONTINUED | OUTPATIENT
Start: 2025-08-11 | End: 2025-08-17

## 2025-08-11 RX ORDER — POLYETHYLENE GLYCOL 3350 17 G/17G
17 POWDER, FOR SOLUTION ORAL DAILY PRN
Status: DISCONTINUED | OUTPATIENT
Start: 2025-08-11 | End: 2025-08-17

## 2025-08-11 RX ORDER — PHENTERMINE HYDROCHLORIDE 37.5 MG/1
37.5 CAPSULE ORAL EVERY MORNING
COMMUNITY

## 2025-08-11 RX ORDER — ENOXAPARIN SODIUM 100 MG/ML
40 INJECTION SUBCUTANEOUS DAILY
Status: DISCONTINUED | OUTPATIENT
Start: 2025-08-11 | End: 2025-08-11

## 2025-08-12 LAB
ANION GAP SERPL CALC-SCNC: 10 MMOL/L (ref 0–18)
BASOPHILS # BLD AUTO: 0.05 X10(3) UL (ref 0–0.2)
BASOPHILS NFR BLD AUTO: 0.3 %
BUN BLD-MCNC: 6 MG/DL (ref 9–23)
CALCIUM BLD-MCNC: 8.8 MG/DL (ref 8.7–10.6)
CHLORIDE SERPL-SCNC: 103 MMOL/L (ref 98–112)
CO2 SERPL-SCNC: 25 MMOL/L (ref 21–32)
CREAT BLD-MCNC: 0.97 MG/DL (ref 0.55–1.02)
EGFRCR SERPLBLD CKD-EPI 2021: 81 ML/MIN/1.73M2 (ref 60–?)
EOSINOPHIL # BLD AUTO: 0.07 X10(3) UL (ref 0–0.7)
EOSINOPHIL NFR BLD AUTO: 0.5 %
ERYTHROCYTE [DISTWIDTH] IN BLOOD BY AUTOMATED COUNT: 12.6 %
GLUCOSE BLD-MCNC: 122 MG/DL (ref 70–99)
GLUCOSE BLD-MCNC: 131 MG/DL (ref 70–99)
GLUCOSE BLD-MCNC: 138 MG/DL (ref 70–99)
GLUCOSE BLD-MCNC: 153 MG/DL (ref 70–99)
GLUCOSE BLD-MCNC: 189 MG/DL (ref 70–99)
HCT VFR BLD AUTO: 34.2 % (ref 35–48)
HGB BLD-MCNC: 11.6 G/DL (ref 12–16)
IMM GRANULOCYTES # BLD AUTO: 0.07 X10(3) UL (ref 0–1)
IMM GRANULOCYTES NFR BLD: 0.5 %
LYMPHOCYTES # BLD AUTO: 1.7 X10(3) UL (ref 1–4)
LYMPHOCYTES NFR BLD AUTO: 11.4 %
MCH RBC QN AUTO: 26.7 PG (ref 26–34)
MCHC RBC AUTO-ENTMCNC: 33.9 G/DL (ref 31–37)
MCV RBC AUTO: 78.8 FL (ref 80–100)
MONOCYTES # BLD AUTO: 0.92 X10(3) UL (ref 0.1–1)
MONOCYTES NFR BLD AUTO: 6.2 %
NEUTROPHILS # BLD AUTO: 12.12 X10 (3) UL (ref 1.5–7.7)
NEUTROPHILS # BLD AUTO: 12.12 X10(3) UL (ref 1.5–7.7)
NEUTROPHILS NFR BLD AUTO: 81.1 %
OSMOLALITY SERPL CALC.SUM OF ELEC: 287 MOSM/KG (ref 275–295)
PLATELET # BLD AUTO: 310 10(3)UL (ref 150–450)
POTASSIUM SERPL-SCNC: 3.6 MMOL/L (ref 3.5–5.1)
RBC # BLD AUTO: 4.34 X10(6)UL (ref 3.8–5.3)
SODIUM SERPL-SCNC: 138 MMOL/L (ref 136–145)
WBC # BLD AUTO: 14.9 X10(3) UL (ref 4–11)

## 2025-08-12 PROCEDURE — 87899 AGENT NOS ASSAY W/OPTIC: CPT | Performed by: INTERNAL MEDICINE

## 2025-08-12 PROCEDURE — 87385 HISTOPLASMA CAPSUL AG IA: CPT | Performed by: INTERNAL MEDICINE

## 2025-08-12 PROCEDURE — 94760 N-INVAS EAR/PLS OXIMETRY 1: CPT

## 2025-08-12 PROCEDURE — 82962 GLUCOSE BLOOD TEST: CPT

## 2025-08-12 PROCEDURE — 87449 NOS EACH ORGANISM AG IA: CPT | Performed by: INTERNAL MEDICINE

## 2025-08-12 PROCEDURE — 85025 COMPLETE CBC W/AUTO DIFF WBC: CPT | Performed by: STUDENT IN AN ORGANIZED HEALTH CARE EDUCATION/TRAINING PROGRAM

## 2025-08-12 PROCEDURE — 80048 BASIC METABOLIC PNL TOTAL CA: CPT | Performed by: STUDENT IN AN ORGANIZED HEALTH CARE EDUCATION/TRAINING PROGRAM

## 2025-08-12 RX ORDER — CIPROFLOXACIN 2 MG/ML
400 INJECTION, SOLUTION INTRAVENOUS EVERY 12 HOURS
Status: DISCONTINUED | OUTPATIENT
Start: 2025-08-12 | End: 2025-08-17

## 2025-08-13 ENCOUNTER — APPOINTMENT (OUTPATIENT)
Dept: GENERAL RADIOLOGY | Facility: HOSPITAL | Age: 30
End: 2025-08-13
Attending: INTERNAL MEDICINE

## 2025-08-13 LAB
ANION GAP SERPL CALC-SCNC: 12 MMOL/L (ref 0–18)
BASOPHILS # BLD AUTO: 0.04 X10(3) UL (ref 0–0.2)
BASOPHILS NFR BLD AUTO: 0.2 %
BUN BLD-MCNC: 5 MG/DL (ref 9–23)
CALCIUM BLD-MCNC: 9.1 MG/DL (ref 8.7–10.6)
CHLORIDE SERPL-SCNC: 98 MMOL/L (ref 98–112)
CO2 SERPL-SCNC: 27 MMOL/L (ref 21–32)
CREAT BLD-MCNC: 0.94 MG/DL (ref 0.55–1.02)
CRYPTOCOCCAL ANTIGEN, SERUM: NEGATIVE
EGFRCR SERPLBLD CKD-EPI 2021: 84 ML/MIN/1.73M2 (ref 60–?)
EOSINOPHIL # BLD AUTO: 0.03 X10(3) UL (ref 0–0.7)
EOSINOPHIL NFR BLD AUTO: 0.2 %
ERYTHROCYTE [DISTWIDTH] IN BLOOD BY AUTOMATED COUNT: 12.7 %
GLUCOSE BLD-MCNC: 160 MG/DL (ref 70–99)
GLUCOSE BLD-MCNC: 168 MG/DL (ref 70–99)
GLUCOSE BLD-MCNC: 175 MG/DL (ref 70–99)
GLUCOSE BLD-MCNC: 176 MG/DL (ref 70–99)
HCT VFR BLD AUTO: 33.9 % (ref 35–48)
HGB BLD-MCNC: 11.5 G/DL (ref 12–16)
IMM GRANULOCYTES # BLD AUTO: 0.09 X10(3) UL (ref 0–1)
IMM GRANULOCYTES NFR BLD: 0.5 %
LYMPHOCYTES # BLD AUTO: 1.65 X10(3) UL (ref 1–4)
LYMPHOCYTES NFR BLD AUTO: 9.4 %
MCH RBC QN AUTO: 26.9 PG (ref 26–34)
MCHC RBC AUTO-ENTMCNC: 33.9 G/DL (ref 31–37)
MCV RBC AUTO: 79.2 FL (ref 80–100)
MONOCYTES # BLD AUTO: 0.92 X10(3) UL (ref 0.1–1)
MONOCYTES NFR BLD AUTO: 5.3 %
NEUTROPHILS # BLD AUTO: 14.76 X10 (3) UL (ref 1.5–7.7)
NEUTROPHILS # BLD AUTO: 14.76 X10(3) UL (ref 1.5–7.7)
NEUTROPHILS NFR BLD AUTO: 84.4 %
OSMOLALITY SERPL CALC.SUM OF ELEC: 285 MOSM/KG (ref 275–295)
PLATELET # BLD AUTO: 334 10(3)UL (ref 150–450)
POTASSIUM SERPL-SCNC: 3.6 MMOL/L (ref 3.5–5.1)
RBC # BLD AUTO: 4.28 X10(6)UL (ref 3.8–5.3)
SODIUM SERPL-SCNC: 137 MMOL/L (ref 136–145)
TROPONIN I SERPL HS-MCNC: <3 NG/L (ref ?–34)
WBC # BLD AUTO: 17.5 X10(3) UL (ref 4–11)

## 2025-08-13 PROCEDURE — 94640 AIRWAY INHALATION TREATMENT: CPT

## 2025-08-13 PROCEDURE — 80048 BASIC METABOLIC PNL TOTAL CA: CPT | Performed by: STUDENT IN AN ORGANIZED HEALTH CARE EDUCATION/TRAINING PROGRAM

## 2025-08-13 PROCEDURE — 71045 X-RAY EXAM CHEST 1 VIEW: CPT | Performed by: INTERNAL MEDICINE

## 2025-08-13 PROCEDURE — 94760 N-INVAS EAR/PLS OXIMETRY 1: CPT

## 2025-08-13 PROCEDURE — 82962 GLUCOSE BLOOD TEST: CPT

## 2025-08-13 PROCEDURE — 87389 HIV-1 AG W/HIV-1&-2 AB AG IA: CPT | Performed by: INTERNAL MEDICINE

## 2025-08-13 PROCEDURE — 85025 COMPLETE CBC W/AUTO DIFF WBC: CPT | Performed by: STUDENT IN AN ORGANIZED HEALTH CARE EDUCATION/TRAINING PROGRAM

## 2025-08-13 PROCEDURE — 84484 ASSAY OF TROPONIN QUANT: CPT | Performed by: INTERNAL MEDICINE

## 2025-08-13 PROCEDURE — 86793 YERSINIA ANTIBODY: CPT | Performed by: INTERNAL MEDICINE

## 2025-08-13 PROCEDURE — 86668 FRANCISELLA TULARENSIS: CPT | Performed by: INTERNAL MEDICINE

## 2025-08-13 RX ORDER — HYDROCODONE BITARTRATE AND ACETAMINOPHEN 5; 325 MG/1; MG/1
1 TABLET ORAL EVERY 4 HOURS PRN
Status: DISCONTINUED | OUTPATIENT
Start: 2025-08-13 | End: 2025-08-17

## 2025-08-13 RX ORDER — HYDROCODONE BITARTRATE AND ACETAMINOPHEN 5; 325 MG/1; MG/1
2 TABLET ORAL EVERY 4 HOURS PRN
Status: DISCONTINUED | OUTPATIENT
Start: 2025-08-13 | End: 2025-08-17

## 2025-08-14 LAB
ANION GAP SERPL CALC-SCNC: 15 MMOL/L (ref 0–18)
BASOPHILS # BLD AUTO: 0.04 X10(3) UL (ref 0–0.2)
BASOPHILS NFR BLD AUTO: 0.2 %
BUN BLD-MCNC: 6 MG/DL (ref 9–23)
CALCIUM BLD-MCNC: 9.3 MG/DL (ref 8.7–10.6)
CHLORIDE SERPL-SCNC: 97 MMOL/L (ref 98–112)
CO2 SERPL-SCNC: 25 MMOL/L (ref 21–32)
CREAT BLD-MCNC: 0.98 MG/DL (ref 0.55–1.02)
EGFRCR SERPLBLD CKD-EPI 2021: 80 ML/MIN/1.73M2 (ref 60–?)
EOSINOPHIL # BLD AUTO: 0.02 X10(3) UL (ref 0–0.7)
EOSINOPHIL NFR BLD AUTO: 0.1 %
ERYTHROCYTE [DISTWIDTH] IN BLOOD BY AUTOMATED COUNT: 12.7 %
GLUCOSE BLD-MCNC: 146 MG/DL (ref 70–99)
GLUCOSE BLD-MCNC: 146 MG/DL (ref 70–99)
GLUCOSE BLD-MCNC: 147 MG/DL (ref 70–99)
GLUCOSE BLD-MCNC: 152 MG/DL (ref 70–99)
GLUCOSE BLD-MCNC: 166 MG/DL (ref 70–99)
HCT VFR BLD AUTO: 34 % (ref 35–48)
HGB BLD-MCNC: 11.3 G/DL (ref 12–16)
IMM GRANULOCYTES # BLD AUTO: 0.18 X10(3) UL (ref 0–1)
IMM GRANULOCYTES NFR BLD: 0.9 %
LYMPHOCYTES # BLD AUTO: 1.43 X10(3) UL (ref 1–4)
LYMPHOCYTES NFR BLD AUTO: 7.2 %
MCH RBC QN AUTO: 26.8 PG (ref 26–34)
MCHC RBC AUTO-ENTMCNC: 33.2 G/DL (ref 31–37)
MCV RBC AUTO: 80.6 FL (ref 80–100)
MONOCYTES # BLD AUTO: 1.29 X10(3) UL (ref 0.1–1)
MONOCYTES NFR BLD AUTO: 6.5 %
NEUTROPHILS # BLD AUTO: 16.79 X10 (3) UL (ref 1.5–7.7)
NEUTROPHILS # BLD AUTO: 16.79 X10(3) UL (ref 1.5–7.7)
NEUTROPHILS NFR BLD AUTO: 85.1 %
OSMOLALITY SERPL CALC.SUM OF ELEC: 284 MOSM/KG (ref 275–295)
PLATELET # BLD AUTO: 327 10(3)UL (ref 150–450)
POTASSIUM SERPL-SCNC: 3.6 MMOL/L (ref 3.5–5.1)
RBC # BLD AUTO: 4.22 X10(6)UL (ref 3.8–5.3)
SODIUM SERPL-SCNC: 137 MMOL/L (ref 136–145)
UR GALACT AG SCR: NEGATIVE
WBC # BLD AUTO: 19.8 X10(3) UL (ref 4–11)

## 2025-08-14 PROCEDURE — 94760 N-INVAS EAR/PLS OXIMETRY 1: CPT

## 2025-08-14 PROCEDURE — 85025 COMPLETE CBC W/AUTO DIFF WBC: CPT | Performed by: STUDENT IN AN ORGANIZED HEALTH CARE EDUCATION/TRAINING PROGRAM

## 2025-08-14 PROCEDURE — 82962 GLUCOSE BLOOD TEST: CPT

## 2025-08-14 PROCEDURE — 80048 BASIC METABOLIC PNL TOTAL CA: CPT | Performed by: STUDENT IN AN ORGANIZED HEALTH CARE EDUCATION/TRAINING PROGRAM

## 2025-08-14 RX ORDER — LEVOFLOXACIN 750 MG/1
750 TABLET, FILM COATED ORAL DAILY
Qty: 10 TABLET | Refills: 0 | Status: SHIPPED | OUTPATIENT
Start: 2025-08-14 | End: 2025-08-24

## 2025-08-15 ENCOUNTER — APPOINTMENT (OUTPATIENT)
Facility: HOSPITAL | Age: 30
End: 2025-08-15
Attending: INTERNAL MEDICINE

## 2025-08-15 LAB
ANION GAP SERPL CALC-SCNC: 12 MMOL/L (ref 0–18)
ASPER FLAVUS: NEGATIVE
ASPER FUMIGATUS: NEGATIVE
ASPER NIGER: NEGATIVE
BASOPHILS # BLD AUTO: 0.06 X10(3) UL (ref 0–0.2)
BASOPHILS NFR BLD AUTO: 0.4 %
BLASTOMYCES ABS QN DID: NEGATIVE
BLASTOMYCES AG INTERP: NEGATIVE
BLASTOMYCES AG INTERP: NEGATIVE
BUN BLD-MCNC: 6 MG/DL (ref 9–23)
CALCIUM BLD-MCNC: 9.3 MG/DL (ref 8.7–10.6)
CHLORIDE SERPL-SCNC: 96 MMOL/L (ref 98–112)
CO2 SERPL-SCNC: 29 MMOL/L (ref 21–32)
CREAT BLD-MCNC: 0.97 MG/DL (ref 0.55–1.02)
EGFRCR SERPLBLD CKD-EPI 2021: 81 ML/MIN/1.73M2 (ref 60–?)
EOSINOPHIL # BLD AUTO: 0.08 X10(3) UL (ref 0–0.7)
EOSINOPHIL NFR BLD AUTO: 0.5 %
ERYTHROCYTE [DISTWIDTH] IN BLOOD BY AUTOMATED COUNT: 12.8 %
FUNGITELL VALUE: <31.25 PG/ML
FUNGITELL VALUE: <31.25 PG/ML
GLUCOSE BLD-MCNC: 116 MG/DL (ref 70–99)
GLUCOSE BLD-MCNC: 127 MG/DL (ref 70–99)
GLUCOSE BLD-MCNC: 128 MG/DL (ref 70–99)
GLUCOSE BLD-MCNC: 151 MG/DL (ref 70–99)
GLUCOSE BLD-MCNC: 170 MG/DL (ref 70–99)
HCT VFR BLD AUTO: 34.1 % (ref 35–48)
HGB BLD-MCNC: 11.3 G/DL (ref 12–16)
IMM GRANULOCYTES # BLD AUTO: 0.1 X10(3) UL (ref 0–1)
IMM GRANULOCYTES NFR BLD: 0.7 %
LYMPHOCYTES # BLD AUTO: 1.71 X10(3) UL (ref 1–4)
LYMPHOCYTES NFR BLD AUTO: 11.5 %
MCH RBC QN AUTO: 26.5 PG (ref 26–34)
MCHC RBC AUTO-ENTMCNC: 33.1 G/DL (ref 31–37)
MCV RBC AUTO: 79.9 FL (ref 80–100)
MONOCYTES # BLD AUTO: 0.96 X10(3) UL (ref 0.1–1)
MONOCYTES NFR BLD AUTO: 6.5 %
NEUTROPHILS # BLD AUTO: 11.96 X10 (3) UL (ref 1.5–7.7)
NEUTROPHILS # BLD AUTO: 11.96 X10(3) UL (ref 1.5–7.7)
NEUTROPHILS NFR BLD AUTO: 80.4 %
OSMOLALITY SERPL CALC.SUM OF ELEC: 283 MOSM/KG (ref 275–295)
PLATELET # BLD AUTO: 325 10(3)UL (ref 150–450)
POTASSIUM SERPL-SCNC: 3.6 MMOL/L (ref 3.5–5.1)
RBC # BLD AUTO: 4.27 X10(6)UL (ref 3.8–5.3)
SODIUM SERPL-SCNC: 137 MMOL/L (ref 136–145)
WBC # BLD AUTO: 14.9 X10(3) UL (ref 4–11)

## 2025-08-15 PROCEDURE — 94760 N-INVAS EAR/PLS OXIMETRY 1: CPT

## 2025-08-15 PROCEDURE — 36410 VNPNXR 3YR/> PHY/QHP DX/THER: CPT

## 2025-08-15 PROCEDURE — 80048 BASIC METABOLIC PNL TOTAL CA: CPT | Performed by: STUDENT IN AN ORGANIZED HEALTH CARE EDUCATION/TRAINING PROGRAM

## 2025-08-15 PROCEDURE — 82962 GLUCOSE BLOOD TEST: CPT

## 2025-08-15 PROCEDURE — 85025 COMPLETE CBC W/AUTO DIFF WBC: CPT | Performed by: STUDENT IN AN ORGANIZED HEALTH CARE EDUCATION/TRAINING PROGRAM

## 2025-08-15 RX ORDER — LIDOCAINE HYDROCHLORIDE 10 MG/ML
5 INJECTION, SOLUTION EPIDURAL; INFILTRATION; INTRACAUDAL; PERINEURAL
Status: ACTIVE | OUTPATIENT
Start: 2025-08-15 | End: 2025-08-16

## 2025-08-16 LAB
ANION GAP SERPL CALC-SCNC: 13 MMOL/L (ref 0–18)
BASOPHILS # BLD AUTO: 0.05 X10(3) UL (ref 0–0.2)
BASOPHILS NFR BLD AUTO: 0.4 %
BUN BLD-MCNC: 6 MG/DL (ref 9–23)
CALCIUM BLD-MCNC: 8.9 MG/DL (ref 8.7–10.6)
CHLORIDE SERPL-SCNC: 97 MMOL/L (ref 98–112)
CO2 SERPL-SCNC: 27 MMOL/L (ref 21–32)
CREAT BLD-MCNC: 0.87 MG/DL (ref 0.55–1.02)
EGFRCR SERPLBLD CKD-EPI 2021: 92 ML/MIN/1.73M2 (ref 60–?)
EOSINOPHIL # BLD AUTO: 0.17 X10(3) UL (ref 0–0.7)
EOSINOPHIL NFR BLD AUTO: 1.3 %
ERYTHROCYTE [DISTWIDTH] IN BLOOD BY AUTOMATED COUNT: 12.7 %
GLUCOSE BLD-MCNC: 124 MG/DL (ref 70–99)
GLUCOSE BLD-MCNC: 125 MG/DL (ref 70–99)
GLUCOSE BLD-MCNC: 134 MG/DL (ref 70–99)
GLUCOSE BLD-MCNC: 139 MG/DL (ref 70–99)
GLUCOSE BLD-MCNC: 159 MG/DL (ref 70–99)
HCT VFR BLD AUTO: 31.4 % (ref 35–48)
HGB BLD-MCNC: 10.4 G/DL (ref 12–16)
IMM GRANULOCYTES # BLD AUTO: 0.08 X10(3) UL (ref 0–1)
IMM GRANULOCYTES NFR BLD: 0.6 %
LYMPHOCYTES # BLD AUTO: 1.28 X10(3) UL (ref 1–4)
LYMPHOCYTES NFR BLD AUTO: 10 %
MCH RBC QN AUTO: 26.6 PG (ref 26–34)
MCHC RBC AUTO-ENTMCNC: 33.1 G/DL (ref 31–37)
MCV RBC AUTO: 80.3 FL (ref 80–100)
MONOCYTES # BLD AUTO: 0.86 X10(3) UL (ref 0.1–1)
MONOCYTES NFR BLD AUTO: 6.7 %
NEUTROPHILS # BLD AUTO: 10.37 X10 (3) UL (ref 1.5–7.7)
NEUTROPHILS # BLD AUTO: 10.37 X10(3) UL (ref 1.5–7.7)
NEUTROPHILS NFR BLD AUTO: 81 %
OSMOLALITY SERPL CALC.SUM OF ELEC: 284 MOSM/KG (ref 275–295)
PLATELET # BLD AUTO: 299 10(3)UL (ref 150–450)
POTASSIUM SERPL-SCNC: 3.5 MMOL/L (ref 3.5–5.1)
RBC # BLD AUTO: 3.91 X10(6)UL (ref 3.8–5.3)
SODIUM SERPL-SCNC: 137 MMOL/L (ref 136–145)
WBC # BLD AUTO: 12.8 X10(3) UL (ref 4–11)

## 2025-08-16 PROCEDURE — 82962 GLUCOSE BLOOD TEST: CPT

## 2025-08-16 PROCEDURE — 94760 N-INVAS EAR/PLS OXIMETRY 1: CPT

## 2025-08-16 PROCEDURE — 80048 BASIC METABOLIC PNL TOTAL CA: CPT | Performed by: STUDENT IN AN ORGANIZED HEALTH CARE EDUCATION/TRAINING PROGRAM

## 2025-08-16 PROCEDURE — 85025 COMPLETE CBC W/AUTO DIFF WBC: CPT | Performed by: STUDENT IN AN ORGANIZED HEALTH CARE EDUCATION/TRAINING PROGRAM

## 2025-08-17 VITALS
BODY MASS INDEX: 47.3 KG/M2 | TEMPERATURE: 98 F | SYSTOLIC BLOOD PRESSURE: 112 MMHG | DIASTOLIC BLOOD PRESSURE: 52 MMHG | RESPIRATION RATE: 17 BRPM | HEIGHT: 65 IN | HEART RATE: 95 BPM | OXYGEN SATURATION: 93 % | WEIGHT: 283.88 LBS

## 2025-08-17 LAB
ANION GAP SERPL CALC-SCNC: 12 MMOL/L (ref 0–18)
BASOPHILS # BLD AUTO: 0.03 X10(3) UL (ref 0–0.2)
BASOPHILS NFR BLD AUTO: 0.2 %
BUN BLD-MCNC: 5 MG/DL (ref 9–23)
CALCIUM BLD-MCNC: 9 MG/DL (ref 8.7–10.6)
CHLORIDE SERPL-SCNC: 98 MMOL/L (ref 98–112)
CO2 SERPL-SCNC: 29 MMOL/L (ref 21–32)
CREAT BLD-MCNC: 0.86 MG/DL (ref 0.55–1.02)
EGFRCR SERPLBLD CKD-EPI 2021: 93 ML/MIN/1.73M2 (ref 60–?)
EOSINOPHIL # BLD AUTO: 0.21 X10(3) UL (ref 0–0.7)
EOSINOPHIL NFR BLD AUTO: 1.7 %
ERYTHROCYTE [DISTWIDTH] IN BLOOD BY AUTOMATED COUNT: 12.7 %
GLUCOSE BLD-MCNC: 127 MG/DL (ref 70–99)
GLUCOSE BLD-MCNC: 134 MG/DL (ref 70–99)
GLUCOSE BLD-MCNC: 145 MG/DL (ref 70–99)
HCT VFR BLD AUTO: 31 % (ref 35–48)
HGB BLD-MCNC: 10.2 G/DL (ref 12–16)
IMM GRANULOCYTES # BLD AUTO: 0.07 X10(3) UL (ref 0–1)
IMM GRANULOCYTES NFR BLD: 0.6 %
LYMPHOCYTES # BLD AUTO: 1.34 X10(3) UL (ref 1–4)
LYMPHOCYTES NFR BLD AUTO: 11 %
MCH RBC QN AUTO: 26.6 PG (ref 26–34)
MCHC RBC AUTO-ENTMCNC: 32.9 G/DL (ref 31–37)
MCV RBC AUTO: 80.7 FL (ref 80–100)
MONOCYTES # BLD AUTO: 0.83 X10(3) UL (ref 0.1–1)
MONOCYTES NFR BLD AUTO: 6.8 %
NEUTROPHILS # BLD AUTO: 9.74 X10 (3) UL (ref 1.5–7.7)
NEUTROPHILS # BLD AUTO: 9.74 X10(3) UL (ref 1.5–7.7)
NEUTROPHILS NFR BLD AUTO: 79.7 %
OSMOLALITY SERPL CALC.SUM OF ELEC: 288 MOSM/KG (ref 275–295)
PLATELET # BLD AUTO: 311 10(3)UL (ref 150–450)
POTASSIUM SERPL-SCNC: 3.6 MMOL/L (ref 3.5–5.1)
RBC # BLD AUTO: 3.84 X10(6)UL (ref 3.8–5.3)
SODIUM SERPL-SCNC: 139 MMOL/L (ref 136–145)
WBC # BLD AUTO: 12.2 X10(3) UL (ref 4–11)

## 2025-08-17 PROCEDURE — 85025 COMPLETE CBC W/AUTO DIFF WBC: CPT | Performed by: STUDENT IN AN ORGANIZED HEALTH CARE EDUCATION/TRAINING PROGRAM

## 2025-08-17 PROCEDURE — 80048 BASIC METABOLIC PNL TOTAL CA: CPT | Performed by: STUDENT IN AN ORGANIZED HEALTH CARE EDUCATION/TRAINING PROGRAM

## 2025-08-17 PROCEDURE — 82962 GLUCOSE BLOOD TEST: CPT

## 2025-08-19 LAB
Lab: NEGATIVE

## 2025-08-20 LAB
Lab: NEGATIVE

## (undated) DEVICE — BAKRI POSTPARTUM BALLOON WITH RAPID INSTILLATION COMPONENTS: Brand: BAKRI

## (undated) NOTE — ED AVS SNAPSHOT
Yue Amaya   MRN: [id number]    Department:  [facility] in [place]   Date of Visit:  11/25/2018           Disclosure     Insurance plans vary and the physician(s) referred by the ER may not be covered by your plan.  Please tell this physician (or your personal doctor if your instructions are to return to your personal doctor) about any new or lasting problems. The primary care or specialist physician will see patients referred from the [facility] Emergency Department.  Cheryle Levins

## (undated) NOTE — ED AVS SNAPSHOT
Zakiya Brain   MRN: [id number]    Department:  [facility] in [place]   Date of Visit:  11/7/2019           Disclosure     Insurance plans vary and the physician(s) referred by the ER may not be covered by your plan.  Please tell this physician (or your personal doctor if your instructions are to return to your personal doctor) about any new or lasting problems. The primary care or specialist physician will see patients referred from the [facility] Emergency Department.  Edgardo Prieto

## (undated) NOTE — LETTER
Date: 3/13/2020    Patient Name: Kennedi Duvall          To Whom it may concern: This letter has been written at the patient's request. The above patient was seen at the [facility] for treatment of a medical condition.     The eliot

## (undated) NOTE — Clinical Note
Continue care with Dr. Beltran   Continue four times daily capillary blood glucose assessments (fasting and 2 hour postprandial)  Weekly Maternal-Fetal Medicine review of capillary blood glucose values  Continue insulin  Follow-up growth ultrasound every 4

## (undated) NOTE — LETTER
Date & Time: 10/13/2020, 4:26 AM  Patient: Corina Muniz  Encounter Provider(s): Tay Alonzo MD       To Whom It May Concern:    Corina Muniz was seen and treated in our department on 10/13/2020.  She should not return to work until 10/1

## (undated) NOTE — LETTER
[address]  [address], [address] [address] [phone]     Patient: Maria E Hernandez   YOB: 1995   Date of Visit: 11/29/2021     Dear Employer,        November 29, 2021    At [facility], we are symptoms may discontinue isolation and other precautions 10 days after the date of their first positive RT-PCR test for SARS-CoV-2 RNA.     Persons who are asymptomatic but have been exposed, CDC recommends 14 days of quarantine after exposure based on the

## (undated) NOTE — LETTER
ASTHMA ACTION PLAN for Dilma Geller     : 1995     Date: 2021  Provider:  Diana Morgan MD  Phone for doctor or clinic: [address] [facility], [address] [place] [id number] [phone]    ACT Score: 16 caregiver if necessary) on the phone and mailed copy to patient or submitted via [employer].      Signatures:  Provider  Brody Geronimo MD   Patient Caretaker

## (undated) NOTE — LETTER
Maria E Hernandez, :1995    CONSENT FOR PROCEDURE/SEDATION    1. I authorize the performance upon Maria E Hernandez  the following: Mirena  IUD Insertion     2.  I authorize Dr. Delfino Stacy MD (and whomever is designated as the doctor’s ass ____________________________________________    Witness: _________________________________________ Date:___________     Physician Signature: _______________________________ Date:___________

## (undated) NOTE — LETTER
Date & Time: 6/21/2024, 3:24 AM  Patient: Cheli Gordon  Encounter Provider(s):    Brittney Frances DO       To Whom It May Concern:    Cheli Gordon was seen and treated in our department on 6/20/2024. She should not return to work until 6/22/24 .    If you have any questions or concerns, please do not hesitate to call.        _____________________________  Physician/APC Signature

## (undated) NOTE — ED AVS SNAPSHOT
Mary Lou Calderon   MRN: [id number]    Department:  [facility] in [place]   Date of Visit:  12/14/2018           Disclosure     Insurance plans vary and the physician(s) referred by the ER may not be covered by your plan.  Please tell this physician (or your personal doctor if your instructions are to return to your personal doctor) about any new or lasting problems. The primary care or specialist physician will see patients referred from the [facility] Emergency Department.  Chetan Day

## (undated) NOTE — LETTER
ASTHMA ACTION PLAN for Paul Grant     : 1995     Date: 2023  Provider:  Aguilar Grant MD  Phone for doctor or clinic: [facility] [address] [facility] [address], [address] [address] [address]  [place] [address] [phone]    ACT Score: 14      You can use the colors of a traffic light to help learn about your asthma medicines. 1. Green - Go! % of Personal Best Peak Flow Use controller medicine. Breathing is good  No cough or wheeze  Can work and play Medicine How much to take When to take it    Wixela                      Inhale 1 puff into the lungs every 12 (twelve) hours      2. Yellow - Caution. 50-79% Personal Best Peak  Flow. Use reliever medicine to keep an asthma attack from getting bad. Cough  Wheezing  Tight Chest  Wake up at night Medicine How much to take When to take it    Albuterol Inhaler                   2 puffs                             Every 4 to 6 hrs as needed for                                                                                  shortness of breath and wheezing. Additional instructions Call our office in event of an increased use of rescue inhaler to make an appointment in order to prevent red zone symptoms. 3. Red - Stop! Danger!  <50% Personal Best Peak  Flow. Take these medications until  Get help from a doctor   Medicine not helping  Breathing is hard and fast  Nose opens wide  Can't walk  Ribs show  Can't talk well Medicine How much to take When to take it    Call 911, or go to the emergency room immediately! Take Albuterol every 15 minutes until you have received medical attention. Additional Instructions If your symptoms do not improve and you cannot contact your doctor, [facility] or call 911 immediately! [x] Asthma Action Plan reviewed with patient (and caregiver if necessary) and a copy of the plan was given to the patient/caregiver.    [] Asthma Action Plan reviewed with patient (and caregiver if necessary) on the phone and mailed copy to patient or submitted via [facility] Marino.      Signatures:  Provider  Ki Tan MD   Patient Caretaker

## (undated) NOTE — LETTER
Date & Time: 3/4/2025, 8:22 PM  Patient: Cheli Gordon  Encounter Provider(s):    Brenda Tapia PA-C       To Whom It May Concern:    Cheli Gordon was seen and treated in our department on 3/4/2025.  Recommend weightbearing and activity only as tolerated for the next 7 to 10 days.  If you have any questions or concerns, please do not hesitate to call.        _____________________________  Physician/APC Signature

## (undated) NOTE — Clinical Note
IUP at 36w3d  Elevated trisomy 21 risk tetra screen -  low-risk cell free fetal DNA, declined invasive testing  Pre-gestational diabetes - poor compliance with sending glucose values to [facility]  moderate persistent asthma  Class III Obesity  Mild preeclampsia

## (undated) NOTE — LETTER
06/18/21        Savanna Johansen [address] [place] [address]      Dear Man Clements,    [address] records indicate that you have outstanding lab work and or testing that was ordered for you and has not yet been completed:        THADDEUS Maciel W P

## (undated) NOTE — LETTER
Caterina Burrell, :1995    CONSENT FOR PROCEDURE/SEDATION    1. I authorize the performance upon Caterina Burrell  the following: Intrauterine Device Removal Mirena    2.  I authorize ANKUR Hoffmann (and whomever is designated as t Relationship to patient: ____________________________________________    Witness: _________________________________________ Date:___________     Physician Signature: _______________________________ Date:___________

## (undated) NOTE — LETTER
Date & Time: 1/21/2025, 6:53 AM  Patient: Cheli Gordon  Encounter Provider(s):    Buck Valero MD       To Whom It May Concern:    Cheli Gordon was seen and treated in our department on 1/21/2025. She can return to work on 1/22/2025.    If you have any questions or concerns, please do not hesitate to call.        _____________________________  Physician/APC Signature

## (undated) NOTE — ED AVS SNAPSHOT
Li Morgan   MRN: [id number]    Department:  [facility] Emergency Department in [place]   Date of Visit:  8/18/2017           Disclosure     Insurance plans vary and the physician(s) referred by the ER may not be covered by your plan.  Please contact If you have been prescribed any medication(s), please fill your prescription right away and begin taking the medication(s) as directed    If the emergency physician has read X-rays, these will be re-interpreted by a radiologist.  If there is a significant

## (undated) NOTE — ED AVS SNAPSHOT
Kennedi Duvall   MRN: [id number]    Department:  [facility] Emergency Department in [place]   Date of Visit:  12/9/2018           Disclosure     Insurance plans vary and the physician(s) referred by the ER may not be covered by your plan.  Please tell this physician (or your personal doctor if your instructions are to return to your personal doctor) about any new or lasting problems. The primary care or specialist physician will see patients referred from the [facility] Emergency Department.  Mike Bakrer

## (undated) NOTE — LETTER
Date & Time: 11/7/2019, 7:53 PM  Patient: Gavin Beltran  Encounter Provider(s): Munir Lowry MD       To Whom It May Concern:    Gavin Beltran was seen and treated in our department on 11/7/2019.  She is to be excused from work

## (undated) NOTE — LETTER
Date & Time: 8/25/2024, 8:56 AM  Patient: Cheli Gorodn  Encounter Provider(s):    Dennis Lao MD Keane, Mary, APRN       To Whom It May Concern:    Cheli Gordon was seen and treated in our department on 8/25/2024. She can return to work with these limitations: limit weight bearing activity on right foot for the week of 8/26/24 .    If you have any questions or concerns, please do not hesitate to call.        _____________________________  Physician/APC Signature